# Patient Record
Sex: MALE | Race: WHITE | NOT HISPANIC OR LATINO | Employment: FULL TIME | URBAN - METROPOLITAN AREA
[De-identification: names, ages, dates, MRNs, and addresses within clinical notes are randomized per-mention and may not be internally consistent; named-entity substitution may affect disease eponyms.]

---

## 2017-04-03 ENCOUNTER — HOSPITAL ENCOUNTER (EMERGENCY)
Facility: MEDICAL CENTER | Age: 47
End: 2017-04-03
Attending: EMERGENCY MEDICINE

## 2017-04-03 VITALS
SYSTOLIC BLOOD PRESSURE: 138 MMHG | OXYGEN SATURATION: 98 % | HEIGHT: 78 IN | TEMPERATURE: 97.3 F | BODY MASS INDEX: 25.33 KG/M2 | WEIGHT: 218.92 LBS | HEART RATE: 87 BPM | RESPIRATION RATE: 18 BRPM | DIASTOLIC BLOOD PRESSURE: 88 MMHG

## 2017-04-03 DIAGNOSIS — L03.211 FACIAL CELLULITIS: ICD-10-CM

## 2017-04-03 PROCEDURE — A9270 NON-COVERED ITEM OR SERVICE: HCPCS | Performed by: EMERGENCY MEDICINE

## 2017-04-03 PROCEDURE — 99283 EMERGENCY DEPT VISIT LOW MDM: CPT

## 2017-04-03 PROCEDURE — 700102 HCHG RX REV CODE 250 W/ 637 OVERRIDE(OP): Performed by: EMERGENCY MEDICINE

## 2017-04-03 RX ORDER — HYDROCODONE BITARTRATE AND ACETAMINOPHEN 5; 325 MG/1; MG/1
1 TABLET ORAL ONCE
Status: COMPLETED | OUTPATIENT
Start: 2017-04-03 | End: 2017-04-03

## 2017-04-03 RX ORDER — SULFAMETHOXAZOLE AND TRIMETHOPRIM 800; 160 MG/1; MG/1
1 TABLET ORAL 2 TIMES DAILY
Qty: 14 TAB | Refills: 0 | Status: SHIPPED | OUTPATIENT
Start: 2017-04-03 | End: 2017-04-10

## 2017-04-03 RX ORDER — SULFAMETHOXAZOLE AND TRIMETHOPRIM 800; 160 MG/1; MG/1
1 TABLET ORAL ONCE
Status: COMPLETED | OUTPATIENT
Start: 2017-04-03 | End: 2017-04-03

## 2017-04-03 RX ORDER — LORAZEPAM 0.5 MG/1
0.5 TABLET ORAL EVERY 4 HOURS PRN
COMMUNITY

## 2017-04-03 RX ORDER — CEPHALEXIN 500 MG/1
500 CAPSULE ORAL 4 TIMES DAILY
Qty: 28 CAP | Refills: 0 | Status: SHIPPED | OUTPATIENT
Start: 2017-04-03 | End: 2017-04-10

## 2017-04-03 RX ORDER — CEPHALEXIN 500 MG/1
500 CAPSULE ORAL ONCE
Status: COMPLETED | OUTPATIENT
Start: 2017-04-03 | End: 2017-04-03

## 2017-04-03 RX ORDER — HYDROCODONE BITARTRATE AND ACETAMINOPHEN 5; 325 MG/1; MG/1
1-2 TABLET ORAL EVERY 4 HOURS PRN
Qty: 18 TAB | Refills: 0 | Status: SHIPPED | OUTPATIENT
Start: 2017-04-03 | End: 2017-12-07

## 2017-04-03 RX ADMIN — HYDROCODONE BITARTRATE AND ACETAMINOPHEN 1 TABLET: 5; 325 TABLET ORAL at 05:04

## 2017-04-03 RX ADMIN — SULFAMETHOXAZOLE AND TRIMETHOPRIM 1 TABLET: 800; 160 TABLET ORAL at 05:05

## 2017-04-03 RX ADMIN — CEPHALEXIN 500 MG: 500 CAPSULE ORAL at 05:05

## 2017-04-03 ASSESSMENT — PAIN SCALES - GENERAL: PAINLEVEL_OUTOF10: 9

## 2017-04-03 ASSESSMENT — ENCOUNTER SYMPTOMS
FEVER: 0
SHORTNESS OF BREATH: 0
CHILLS: 1

## 2017-04-03 NOTE — DISCHARGE INSTRUCTIONS
Facial Infection  You have an infection of your face. This requires special attention to help prevent serious problems. Infections in facial wounds can cause poor healing and scars. They can also spread to deeper tissues, especially around the eye. Wound and dental infections can lead to sinusitis, infection of the eye socket, and even meningitis. Permanent damage to the skin, eye, and nervous system may result if facial infections are not treated properly. With severe infections, hospital care for IV antibiotic injections may be needed if they don't respond to oral antibiotics.  Antibiotics must be taken for the full course to insure the infection is eliminated. If the infection came from a bad tooth, it may have to be extracted when the infection is under control. Warm compresses may be applied to reduce skin irritation and remove drainage.  You might need a tetanus shot now if:  · You cannot remember when your last tetanus shot was.   · You have never had a tetanus shot.   · The object that caused your wound was dirty.   If you need a tetanus shot, and you decide not to get one, there is a rare chance of getting tetanus. Sickness from tetanus can be serious. If you got a tetanus shot, your arm may swell, get red and warm to the touch at the shot site. This is common and not a problem.  SEEK IMMEDIATE MEDICAL CARE IF:   · You have increased swelling, redness, or trouble breathing.   · You have a severe headache, dizziness, nausea, or vomiting.   · You develop problems with your eyesight.   · You have a fever.   Document Released: 01/25/2006 Document Revised: 03/11/2013 Document Reviewed: 12/18/2006  ExitCare® Patient Information ©2013 [employer]â„¢.      Cellulitis  Cellulitis is an infection of the skin and the tissue under the skin. The infected area is usually red and tender. This happens most often in the arms and lower legs.  HOME CARE   · Take your antibiotic medicine as told. Finish the medicine even if you  start to feel better.  · Keep the infected arm or leg raised (elevated).  · Put a warm cloth on the area up to 4 times per day.  · Only take medicines as told by your doctor.  · Keep all doctor visits as told.  GET HELP IF:  · You see red streaks on the skin coming from the infected area.  · Your red area gets bigger or turns a dark color.  · Your bone or joint under the infected area is painful after the skin heals.  · Your infection comes back in the same area or different area.  · You have a puffy (swollen) bump in the infected area.  · You have new symptoms.  · You have a fever.  GET HELP RIGHT AWAY IF:   · You feel very sleepy.  · You throw up (vomit) or have watery poop (diarrhea).  · You feel sick and have muscle aches and pains.  MAKE SURE YOU:   · Understand these instructions.  · Will watch your condition.  · Will get help right away if you are not doing well or get worse.     This information is not intended to replace advice given to you by your health care provider. Make sure you discuss any questions you have with your health care provider.     Document Released: 06/05/2009 Document Revised: 01/08/2016 Document Reviewed: 03/04/2013  [employer] Interactive Patient Education ©2016 Elsevier Inc.

## 2017-04-03 NOTE — ED AVS SNAPSHOT
Yottaa Access Code: [email or web address]  Expires: 4/15/2017 12:03 PM    Yottaa  A secure, online tool to manage your health information     [employer]’s Yottaa® is a secure, online tool that connects you to your personalized health information from the privacy of your home -- day or night - making it very easy for you to manage your healthcare. Once the activation process is completed, you can even access your medical information using the Yottaa blanca, which is available for free in the Apple Blanca store or Google Play store.     Yottaa provides the following levels of access (as shown below):   My Chart Features   [facility] Primary Care Doctor [facility]  Specialists [facility]  Urgent  Care Non-[facility]  Primary Care  Doctor   Email your healthcare team securely and privately 24/7 X X X X   Manage appointments: schedule your next appointment; view details of past/upcoming appointments X      Request prescription refills. X      View recent personal medical records, including lab and immunizations X X X X   View health record, including health history, allergies, medications X X X X   Read reports about your outpatient visits, procedures, consult and ER notes X X X X   See your discharge summary, which is a recap of your hospital and/or ER visit that includes your diagnosis, lab results, and care plan. X X       How to register for Yottaa:  1. Go to  [email or web address].  2. Click on the Sign Up Now box, which takes you to the New Member Sign Up page. You will need to provide the following information:  a. Enter your Yottaa Access Code exactly as it appears at the top of this page. (You will not need to use this code after you’ve completed the sign-up process. If you do not sign up before the expiration date, you must request a new code.)   b. Enter your date of birth.   c. Enter your home email address.   d. Click Submit, and follow the next screen’s instructions.  3. Create a Yottaa ID. This will be your Yottaa  login ID and cannot be changed, so think of one that is secure and easy to remember.  4. Create a [employer] password. You can change your password at any time.  5. Enter your Password Reset Question and Answer. This can be used at a later time if you forget your password.   6. Enter your e-mail address. This allows you to receive e-mail notifications when new information is available in [employer].  7. Click Sign Up. You can now view your health information.    For assistance activating your [employer] account, call [phone]

## 2017-04-03 NOTE — ED AVS SNAPSHOT
Home Care Instructions                                                                                                                Cristofer Mallory   MRN: [id number]    Department:  [facility], Emergency Dept   Date of Visit:  4/3/2017            [facility], Emergency Dept    [address] [place] [id number]    Phone:  [phone]      You were seen by     Letitia Moore D.O.      Your Diagnosis Was     Facial cellulitis     L03.211 left      These are the medications you received during your hospitalization from 04/03/2017 0349 to 04/03/2017 0507     Date/Time Order Dose Route Action    04/03/2017 0504 hydrocodone-acetaminophen (NORCO) 5-325 MG per tablet 1 Tab 1 Tab Oral Given    04/03/2017 0505 sulfamethoxazole-trimethoprim (BACTRIM DS) 800-160 MG tablet 1 Tab 1 Tab Oral Given    04/03/2017 0505 cephALEXin (KEFLEX) capsule 500 mg 500 mg Oral Given      Follow-up Information     1. Follow up with [facility], Emergency Dept.    Specialty:  Emergency Medicine    Why:  If symptoms worsen    Contact information    [address] [place] [id number] [phone]      Medication Information     Review all of your home medications and newly ordered medications with your primary doctor and/or pharmacist as soon as possible. Follow medication instructions as directed by your doctor and/or pharmacist.     Please keep your complete medication list with you and share with your physician. Update the information when medications are discontinued, doses are changed, or new medications (including over-the-counter products) are added; and carry medication information at all times in the event of emergency situations.               Medication List      START taking these medications        Instructions    Morning Afternoon Evening Bedtime    cephALEXin 500 MG Caps   Last time this was given:  500 mg on 4/3/2017  5:05 AM   Commonly known as:  KEFLEX        Take  1 Cap by mouth 4 times a day for 7 days.   Dose:  500 mg                        hydrocodone-acetaminophen 5-325 MG Tabs per tablet   Last time this was given:  1 Tab on 4/3/2017  5:04 AM   Commonly known as:  NORCO        Take 1-2 Tabs by mouth every four hours as needed.   Dose:  1-2 Tab                        sulfamethoxazole-trimethoprim 800-160 MG tablet   Last time this was given:  1 Tab on 4/3/2017  5:05 AM   Commonly known as:  BACTRIM DS        Take 1 Tab by mouth 2 times a day for 7 days.   Dose:  1 Tab                          ASK your doctor about these medications        Instructions    Morning Afternoon Evening Bedtime    ATIVAN 0.5 MG Tabs   Generic drug:  lorazepam        Take 0.5 mg by mouth every four hours as needed for Anxiety.   Dose:  0.5 mg                             Where to Get Your Medications      You can get these medications from any pharmacy     Bring a paper prescription for each of these medications    - cephALEXin 500 MG Caps  - hydrocodone-acetaminophen 5-325 MG Tabs per tablet  - sulfamethoxazole-trimethoprim 800-160 MG tablet              Discharge Instructions       Facial Infection  You have an infection of your face. This requires special attention to help prevent serious problems. Infections in facial wounds can cause poor healing and scars. They can also spread to deeper tissues, especially around the eye. Wound and dental infections can lead to sinusitis, infection of the eye socket, and even meningitis. Permanent damage to the skin, eye, and nervous system may result if facial infections are not treated properly. With severe infections, hospital care for IV antibiotic injections may be needed if they don't respond to oral antibiotics.  Antibiotics must be taken for the full course to insure the infection is eliminated. If the infection came from a bad tooth, it may have to be extracted when the infection is under control. Warm compresses may be applied to reduce skin  irritation and remove drainage.  You might need a tetanus shot now if:  · You cannot remember when your last tetanus shot was.   · You have never had a tetanus shot.   · The object that caused your wound was dirty.   If you need a tetanus shot, and you decide not to get one, there is a rare chance of getting tetanus. Sickness from tetanus can be serious. If you got a tetanus shot, your arm may swell, get red and warm to the touch at the shot site. This is common and not a problem.  SEEK IMMEDIATE MEDICAL CARE IF:   · You have increased swelling, redness, or trouble breathing.   · You have a severe headache, dizziness, nausea, or vomiting.   · You develop problems with your eyesight.   · You have a fever.   Document Released: 01/25/2006 Document Revised: 03/11/2013 Document Reviewed: 12/18/2006  Daylife® Patient Information ©2013 [employer].      Cellulitis  Cellulitis is an infection of the skin and the tissue under the skin. The infected area is usually red and tender. This happens most often in the arms and lower legs.  HOME CARE   · Take your antibiotic medicine as told. Finish the medicine even if you start to feel better.  · Keep the infected arm or leg raised (elevated).  · Put a warm cloth on the area up to 4 times per day.  · Only take medicines as told by your doctor.  · Keep all doctor visits as told.  GET HELP IF:  · You see red streaks on the skin coming from the infected area.  · Your red area gets bigger or turns a dark color.  · Your bone or joint under the infected area is painful after the skin heals.  · Your infection comes back in the same area or different area.  · You have a puffy (swollen) bump in the infected area.  · You have new symptoms.  · You have a fever.  GET HELP RIGHT AWAY IF:   · You feel very sleepy.  · You throw up (vomit) or have watery poop (diarrhea).  · You feel sick and have muscle aches and pains.  MAKE SURE YOU:   · Understand these instructions.  · Will watch your  condition.  · Will get help right away if you are not doing well or get worse.     This information is not intended to replace advice given to you by your health care provider. Make sure you discuss any questions you have with your health care provider.     Document Released: 06/05/2009 Document Revised: 01/08/2016 Document Reviewed: 03/04/2013  [employer] Interactive Patient Education ©2016 [employer]            Patient Information     Patient Information    Following emergency treatment: all patient requiring follow-up care must return either to a private physician or a clinic if your condition worsens before you are able to obtain further medical attention, please return to the emergency room.     Billing Information    At [facility], we work to make the billing process streamlined for our patients.  Our Representatives are here to answer any questions you may have regarding your hospital bill.  If you have insurance coverage and have supplied your insurance information to us, we will submit a claim to your insurer on your behalf.  Should you have any questions regarding your bill, we can be reached online or by phone as follows:  Online: You are able pay your bills online or live chat with our representatives about any billing questions you may have. We are here to help Monday - Friday from 8:00am to 7:30pm and 9:00am - 12:00pm on Saturdays.  Please visit [email or web address]  for more information.   Phone:  [phone] or [phone]    Please note that your emergency physician, surgeon, pathologist, radiologist, anesthesiologist, and other specialists are not employed by [facility] and will therefore bill separately for their services.  Please contact them directly for any questions concerning their bills at the numbers below:     Emergency Physician Services:  [phone]  [facility]:  [phone]  [facility]:  [phone]  [facility]:  [phone]    1. Your final bill may vary from the amount quoted upon discharge if all procedures are not complete at that time, or if your doctor has additional procedures of which we are not aware. You will receive an additional bill if you return to the Emergency Department at [facility] for suture removal regardless of the facility of which the sutures were placed.     2. Please arrange for settlement of this account at the emergency registration.    3. All self-pay accounts are due in full at the time of treatment.  If you are unable to meet this obligation then payment is expected within 4-5 days.     4. If you have had radiology studies (CT, X-ray, Ultrasound, MRI), you have received a preliminary result during your emergency department visit. Please contact the radiology department [phone] to receive a copy of your final result. Please discuss the Final result with your primary physician or with the follow up physician provided.     Crisis Hotline:  [place] Crisis Hotline:  0-932-YCDSEUH or 1-591.357.2251  [place] Crisis Hotline:    1-404.152.8724 or 624-687-9026         ED Discharge Follow Up Questions    1. In order to provide you with very good care, we would like to follow up with a phone call in the next few days.  May we have your permission to contact you?     YES /  NO    2. What is the best phone number to call you? (       )_____-__________    3. What is the best time to call you?      Morning  /  Afternoon  /  Evening                   Patient Signature:  ____________________________________________________________    Date:  ____________________________________________________________

## 2017-04-03 NOTE — ED AVS SNAPSHOT
4/3/2017          Cristofer Mallory  [address]  [address]  [facility] [address]    Dear Cristofer:    [facility] wants to ensure your discharge home is safe and you or your loved ones have had all your questions answered regarding your care after you leave the hospital.    You may receive a telephone call within two days of your discharge.  This call is to make certain you understand your discharge instructions as well as ensure we provided you with the best care possible during your stay with us.     The call will only last approximately 3-5 minutes and will be done by a nurse.    Once again, we want to ensure your discharge home is safe and that you have a clear understanding of any next steps in your care.  If you have any questions or concerns, please do not hesitate to contact us, we are here for you.  Thank you for choosing [facility] for your healthcare needs.    Sincerely,    Malvin Mcmanus    [facility]

## 2017-04-03 NOTE — ED NOTES
Patient to ED triage with complaints of congestions and left side facial swelling. States he has hx of same and was told it was a sinus infection. Left side face is swollen and tender. No difficulty swallowing. No respiratory complaints. Swelling started Saturday. No fever identified at home.     Pt educated on ED process and asked to wait in lobby. Patient educated on importance of alerting staff to new or worsening symptoms or concerns.

## 2017-04-03 NOTE — ED PROVIDER NOTES
ED Provider Note    Scribed for Letitia Moore D.O. by Kavin Clay. 4/3/2017, 4:32 AM.    Primary care provider: Pcp Pt States None  Means of arrival: Walk-in  History obtained from: Patient  History limited by: None    CHIEF COMPLAINT  Chief Complaint   Patient presents with   • Facial Swelling     left facial swelling   • Congestion       HPI  Cristofer Mallory is a 46 y.o. male who presents to the Emergency Department with left sided facial swelling and congestion onset 4/1/17.  The patient states that the swelling moved from his cheek toward the front of his face after it began.  His first experience with the swelling was able to be resolved with ice and Advil.  The second experience needed further treatment after being seen by a physician.  He has had associated chills, but no fevers or difficulty breathing.  He states that he has slight dental pain, but nothing incredibly significant.  Notes that he is able to eat all things without difficulty or increased sensitivity.  The patient has no known allergies.      He was here in October 2016 when he reported similar symptoms in July.    REVIEW OF SYSTEMS  Review of Systems   Constitutional: Positive for chills. Negative for fever.   HENT: Positive for congestion.         + Left sided facial swelling   Respiratory: Negative for shortness of breath.    All other systems reviewed and are negative.      PAST MEDICAL HISTORY   Has a history of left sided facial swelling which was diagnosed as a sinus infection.    SURGICAL HISTORY   has past surgical history that includes open post rx cerv vert fx,1 lvl.    SOCIAL HISTORY  Social History   Substance Use Topics   • Smoking status: Current Every Day Smoker -- 0.50 packs/day     Types: Cigarettes   • Smokeless tobacco: None      Comment: 3/4 a pack   • Alcohol Use: Yes      Comment: 6 pack a day      History   Drug Use   • Yes     Comment: pot once a week       FAMILY HISTORY  History reviewed. No pertinent family  "history.    CURRENT MEDICATIONS  Home Medications     Reviewed by Germania George R.N. (Registered Nurse) on 04/03/17 at 0404  Med List Status: Complete    Medication Last Dose Status    lorazepam (ATIVAN) 0.5 MG Tab 4/3/2017 Active                ALLERGIES  No Known Allergies    PHYSICAL EXAM  VITAL SIGNS: /95 mmHg  Pulse 103  Temp(Src) 36.3 °C (97.3 °F)  Resp 16  Ht 1.981 m (6' 6\")  Wt 99.3 kg (218 lb 14.7 oz)  BMI 25.30 kg/m2  SpO2 98%  Vitals reviewed.  Constitutional: Patient is oriented to person, place, and time. Appears well-developed and well-nourished. No distress.    Head: Normocephalic and atraumatic.  Diffuse swelling to the left face.  A small pimple lesion on the left cheek with surrounding induration but no fluctuance.  Ears: Normal external ears bilaterally.   Mouth/Throat: Oropharynx is clear and moist, no exudates.  No swelling to the floor of the mouth.  He is managing his own secretions.  Tenderness and swelling to the left upper buccal surface.     Eyes: Conjunctivae are normal. Pupils are equal, round, and reactive to light.   Neck: Normal range of motion. Neck supple.  Cardiovascular: Tachycardic, regular rhythm and normal heart sounds.   Pulmonary/Chest: Effort normal and breath sounds normal. No respiratory distress, no wheezes.  Musculoskeletal: No edema   Lymphadenopathy: Left cervical adenopathy.   Neurological: No focal deficits.   Skin: Skin is warm and dry. No erythema. No pallor.   Psychiatric: Patient has a normal mood and affect.       COURSE & MEDICAL DECISION MAKING  Pertinent Labs & Imaging studies reviewed. (See chart for details)    He was here in October 2016 when he reported similar symptoms in July.    4:32 AM - Patient seen and examined at bedside. Patient was concerned that this may be a sinusitis but I suspect that is not the case. It appears that he likely had a small lesion or pimple on his left face that became infected and he has an associated " cellulitis. There is no evidence of abscess. No indication for incision and drainage at this time. He is advised on application of warm moist compresses 2-3 times per day. Patient will be treated with Norco 5-325 mg, Bactrim 800-160 mg 1 tab, Keflex 500 mg.     The patient will return for new or worsening symptoms and is stable at the time of discharge.    The patient is referred to a primary physician for blood pressure management, diabetic screening, and for all other preventative health concerns.    DISPOSITION:  Patient will be discharged home in stable condition.    FOLLOW UP:  [facility], Emergency Dept  [address] [facility] [address] [phone]    If symptoms worsen      OUTPATIENT MEDICATIONS:  Discharge Medication List as of 4/3/2017  5:07 AM      START taking these medications    Details   sulfamethoxazole-trimethoprim (BACTRIM DS) 800-160 MG tablet Take 1 Tab by mouth 2 times a day for 7 days., Disp-14 Tab, R-0, Print Rx Paper      cephALEXin (KEFLEX) 500 MG Cap Take 1 Cap by mouth 4 times a day for 7 days., Disp-28 Cap, R-0, Print Rx Paper      hydrocodone-acetaminophen (NORCO) 5-325 MG Tab per tablet Take 1-2 Tabs by mouth every four hours as needed., Disp-18 Tab, R-0, Print Rx Paper                 FINAL IMPRESSION  1. Facial cellulitis          Kavin STOLL (Chris), am scribing for, and in the presence of, Letitia Moore D.O..    Electronically signed by: Kavin Esposito), 4/3/2017    Letitia STOLL D.O. personally performed the services described in this documentation, as scribed by Kavin Clay in my presence, and it is both accurate and complete.    The note accurately reflects work and decisions made by me.  Letiita Moore  4/4/2017  11:41 AM

## 2017-04-03 NOTE — ED NOTES
Pt. Ambulated to [place] [address] with steady gait. Pt. Hooked up to monitoring equipment and changed into hospital gown.

## 2017-10-09 ENCOUNTER — HOSPITAL ENCOUNTER (EMERGENCY)
Facility: MEDICAL CENTER | Age: 47
End: 2017-10-09
Attending: EMERGENCY MEDICINE
Payer: COMMERCIAL

## 2017-10-09 ENCOUNTER — APPOINTMENT (OUTPATIENT)
Dept: RADIOLOGY | Facility: MEDICAL CENTER | Age: 47
End: 2017-10-09
Attending: EMERGENCY MEDICINE
Payer: COMMERCIAL

## 2017-10-09 VITALS
HEART RATE: 89 BPM | OXYGEN SATURATION: 100 % | BODY MASS INDEX: 25.15 KG/M2 | WEIGHT: 217.37 LBS | SYSTOLIC BLOOD PRESSURE: 161 MMHG | DIASTOLIC BLOOD PRESSURE: 108 MMHG | HEIGHT: 78 IN | RESPIRATION RATE: 16 BRPM | TEMPERATURE: 98.5 F

## 2017-10-09 DIAGNOSIS — R60.9 PERIPHERAL EDEMA: ICD-10-CM

## 2017-10-09 DIAGNOSIS — L08.9 FINGER INFECTION: ICD-10-CM

## 2017-10-09 LAB
ALBUMIN SERPL BCP-MCNC: 3.8 G/DL (ref 3.2–4.9)
ALBUMIN/GLOB SERPL: 1.2 G/DL
ALP SERPL-CCNC: 73 U/L (ref 30–99)
ALT SERPL-CCNC: 8 U/L (ref 2–50)
ANION GAP SERPL CALC-SCNC: 8 MMOL/L (ref 0–11.9)
AST SERPL-CCNC: 12 U/L (ref 12–45)
BASOPHILS # BLD AUTO: 1.5 % (ref 0–1.8)
BASOPHILS # BLD: 0.13 K/UL (ref 0–0.12)
BILIRUB SERPL-MCNC: 0.4 MG/DL (ref 0.1–1.5)
BNP SERPL-MCNC: 39 PG/ML (ref 0–100)
BUN SERPL-MCNC: 19 MG/DL (ref 8–22)
CALCIUM SERPL-MCNC: 9.3 MG/DL (ref 8.5–10.5)
CHLORIDE SERPL-SCNC: 103 MMOL/L (ref 96–112)
CO2 SERPL-SCNC: 22 MMOL/L (ref 20–33)
CREAT SERPL-MCNC: 0.95 MG/DL (ref 0.5–1.4)
EOSINOPHIL # BLD AUTO: 0.24 K/UL (ref 0–0.51)
EOSINOPHIL NFR BLD: 2.8 % (ref 0–6.9)
ERYTHROCYTE [DISTWIDTH] IN BLOOD BY AUTOMATED COUNT: 43.7 FL (ref 35.9–50)
GFR SERPL CREATININE-BSD FRML MDRD: >60 ML/MIN/1.73 M 2
GLOBULIN SER CALC-MCNC: 3.1 G/DL (ref 1.9–3.5)
GLUCOSE SERPL-MCNC: 101 MG/DL (ref 65–99)
HCT VFR BLD AUTO: 43.9 % (ref 42–52)
HGB BLD-MCNC: 15.4 G/DL (ref 14–18)
IMM GRANULOCYTES # BLD AUTO: 0.02 K/UL (ref 0–0.11)
IMM GRANULOCYTES NFR BLD AUTO: 0.2 % (ref 0–0.9)
LYMPHOCYTES # BLD AUTO: 2.39 K/UL (ref 1–4.8)
LYMPHOCYTES NFR BLD: 28.3 % (ref 22–41)
MCH RBC QN AUTO: 33.7 PG (ref 27–33)
MCHC RBC AUTO-ENTMCNC: 35.1 G/DL (ref 33.7–35.3)
MCV RBC AUTO: 96.1 FL (ref 81.4–97.8)
MONOCYTES # BLD AUTO: 0.61 K/UL (ref 0–0.85)
MONOCYTES NFR BLD AUTO: 7.2 % (ref 0–13.4)
NEUTROPHILS # BLD AUTO: 5.07 K/UL (ref 1.82–7.42)
NEUTROPHILS NFR BLD: 60 % (ref 44–72)
NRBC # BLD AUTO: 0 K/UL
NRBC BLD AUTO-RTO: 0 /100 WBC
PLATELET # BLD AUTO: 360 K/UL (ref 164–446)
PMV BLD AUTO: 8.8 FL (ref 9–12.9)
POTASSIUM SERPL-SCNC: 3.5 MMOL/L (ref 3.6–5.5)
PROT SERPL-MCNC: 6.9 G/DL (ref 6–8.2)
RBC # BLD AUTO: 4.57 M/UL (ref 4.7–6.1)
SODIUM SERPL-SCNC: 133 MMOL/L (ref 135–145)
TROPONIN I SERPL-MCNC: <0.01 NG/ML (ref 0–0.04)
WBC # BLD AUTO: 8.5 K/UL (ref 4.8–10.8)

## 2017-10-09 PROCEDURE — 85025 COMPLETE CBC W/AUTO DIFF WBC: CPT

## 2017-10-09 PROCEDURE — 83880 ASSAY OF NATRIURETIC PEPTIDE: CPT

## 2017-10-09 PROCEDURE — 80053 COMPREHEN METABOLIC PANEL: CPT

## 2017-10-09 PROCEDURE — 99284 EMERGENCY DEPT VISIT MOD MDM: CPT

## 2017-10-09 PROCEDURE — 84484 ASSAY OF TROPONIN QUANT: CPT

## 2017-10-09 PROCEDURE — 71010 DX-CHEST-PORTABLE (1 VIEW): CPT

## 2017-10-09 PROCEDURE — 303977 HCHG I & D

## 2017-10-09 RX ORDER — CEPHALEXIN 500 MG/1
500 CAPSULE ORAL 4 TIMES DAILY
Qty: 28 CAP | Refills: 0 | Status: SHIPPED | OUTPATIENT
Start: 2017-10-09 | End: 2017-10-16

## 2017-10-09 RX ORDER — SULFAMETHOXAZOLE AND TRIMETHOPRIM 800; 160 MG/1; MG/1
1 TABLET ORAL 2 TIMES DAILY
Qty: 14 TAB | Refills: 0 | Status: SHIPPED | OUTPATIENT
Start: 2017-10-09 | End: 2017-10-16

## 2017-10-09 ASSESSMENT — ENCOUNTER SYMPTOMS
FALLS: 0
SENSORY CHANGE: 0
SHORTNESS OF BREATH: 0

## 2017-10-09 ASSESSMENT — PAIN SCALES - GENERAL
PAINLEVEL_OUTOF10: 4
PAINLEVEL_OUTOF10: 5

## 2017-10-09 NOTE — ED NOTES
.  Chief Complaint   Patient presents with   • Sent from Urgent Care   • Digit Pain     right 2nd digit 2 weeks, swelling noted with blister blow the skin.   • Peripheral Edema     apx month and a half. swelling ble with redness.    • Fatigue   ambulated to triage. Denies injury to finger, pain swelling increasing. Also c/o ble edema intermittent x 1.5 month. Denies sob of hx chf.

## 2017-10-10 NOTE — DISCHARGE INSTRUCTIONS
Peripheral Edema  You have swelling in your legs (peripheral edema). This swelling is due to excess accumulation of salt and water in your body. Edema may be a sign of heart, kidney or liver disease, or a side effect of a medication. It may also be due to problems in the leg veins. Elevating your legs and using special support stockings may be very helpful, if the cause of the swelling is due to poor venous circulation. Avoid long periods of standing, whatever the cause.  Treatment of edema depends on identifying the cause. Chips, pretzels, pickles and other salty foods should be avoided. Restricting salt in your diet is almost always needed. Water pills (diuretics) are often used to remove the excess salt and water from your body via urine. These medicines prevent the kidney from reabsorbing sodium. This increases urine flow.  Diuretic treatment may also result in lowering of potassium levels in your body. Potassium supplements may be needed if you have to use diuretics daily. Daily weights can help you keep track of your progress in clearing your edema. You should call your caregiver for follow up care as recommended.  SEEK IMMEDIATE MEDICAL CARE IF:   · You have increased swelling, pain, redness, or heat in your legs.  · You develop shortness of breath, especially when lying down.  · You develop chest or abdominal pain, weakness, or fainting.  · You have a fever.     This information is not intended to replace advice given to you by your health care provider. Make sure you discuss any questions you have with your health care provider.     Document Released: 01/25/2006 Document Revised: 03/11/2013 Document Reviewed: 01/05/2011  [employer] Interactive Patient Education ©2016 [employer]      Fingertip Infection  When an infection is around the nail, it is called a paronychia. When it appears over the tip of the finger, it is called a felon. These infections are due to minor injuries or cracks in the skin. If they  are not treated properly, they can lead to bone infection and permanent damage to the fingernail.  Incision and drainage is necessary if a pus pocket (an abscess) has formed. Antibiotics and pain medicine may also be needed. Keep your hand elevated for the next 2-3 days to reduce swelling and pain. If a pack was placed in the abscess, it should be removed in 1-2 days by your caregiver. Soak the finger in warm water for 20 minutes 4 times daily to help promote drainage.  Keep the hands as dry as possible. Wear protective gloves with cotton liners. See your caregiver for follow-up care as recommended.   HOME CARE INSTRUCTIONS   · Keep wound clean, dry and dressed as suggested by your caregiver.  · Soak in warm salt water for fifteen minutes, four times per day for bacterial infections.  · Your caregiver will prescribe an antibiotic if a bacterial infection is suspected. Take antibiotics as directed and finish the prescription, even if the problem appears to be improving before the medicine is gone.  · Only take over-the-counter or prescription medicines for pain, discomfort, or fever as directed by your caregiver.  SEEK IMMEDIATE MEDICAL CARE IF:  · There is redness, swelling, or increasing pain in the wound.  · Pus or any other unusual drainage is coming from the wound.  · An unexplained oral temperature above 102° F (38.9° C) develops.  · You notice a foul smell coming from the wound or dressing.  MAKE SURE YOU:   · Understand these instructions.  · Monitor your condition.  · Contact your caregiver if you are getting worse or not improving.     This information is not intended to replace advice given to you by your health care provider. Make sure you discuss any questions you have with your health care provider.     Document Released: 01/25/2006 Document Revised: 03/11/2013 Document Reviewed: 01/21/2010  [employer] Patient Education ©2016 [employer]

## 2017-10-10 NOTE — ED NOTES
Presents to the ED with c/o bilateral lower leg swelling x 1 month. Mild work of breathing noticed. Also endorses blackened area to the 2nd finger tip/nail. Has not seen a PCP in 4-5 years. A&Ox4. Family member endorses he has dyspnea with exertion.

## 2017-12-07 ENCOUNTER — APPOINTMENT (OUTPATIENT)
Dept: RADIOLOGY | Facility: MEDICAL CENTER | Age: 47
DRG: 138 | End: 2017-12-07
Attending: EMERGENCY MEDICINE
Payer: COMMERCIAL

## 2017-12-07 ENCOUNTER — RESOLUTE PROFESSIONAL BILLING HOSPITAL PROF FEE (OUTPATIENT)
Dept: HOSPITALIST | Facility: MEDICAL CENTER | Age: 47
End: 2017-12-07
Payer: COMMERCIAL

## 2017-12-07 ENCOUNTER — HOSPITAL ENCOUNTER (INPATIENT)
Facility: MEDICAL CENTER | Age: 47
LOS: 1 days | DRG: 138 | End: 2017-12-08
Attending: EMERGENCY MEDICINE | Admitting: HOSPITALIST
Payer: COMMERCIAL

## 2017-12-07 DIAGNOSIS — K04.7 TOOTH INFECTION: ICD-10-CM

## 2017-12-07 DIAGNOSIS — R22.0 FACIAL SWELLING: ICD-10-CM

## 2017-12-07 PROBLEM — R60.0 BILATERAL LOWER EXTREMITY EDEMA: Status: ACTIVE | Noted: 2017-12-07

## 2017-12-07 PROBLEM — Z72.0 TOBACCO ABUSE: Status: ACTIVE | Noted: 2017-12-07

## 2017-12-07 LAB
ALBUMIN SERPL BCP-MCNC: 4 G/DL (ref 3.2–4.9)
ALBUMIN/GLOB SERPL: 1.4 G/DL
ALP SERPL-CCNC: 68 U/L (ref 30–99)
ALT SERPL-CCNC: 10 U/L (ref 2–50)
ANION GAP SERPL CALC-SCNC: 8 MMOL/L (ref 0–11.9)
AST SERPL-CCNC: 14 U/L (ref 12–45)
BASOPHILS # BLD AUTO: 0.9 % (ref 0–1.8)
BASOPHILS # BLD: 0.09 K/UL (ref 0–0.12)
BILIRUB SERPL-MCNC: 1 MG/DL (ref 0.1–1.5)
BUN SERPL-MCNC: 9 MG/DL (ref 8–22)
CALCIUM SERPL-MCNC: 9.2 MG/DL (ref 8.5–10.5)
CHLORIDE SERPL-SCNC: 102 MMOL/L (ref 96–112)
CO2 SERPL-SCNC: 27 MMOL/L (ref 20–33)
CREAT SERPL-MCNC: 0.93 MG/DL (ref 0.5–1.4)
EOSINOPHIL # BLD AUTO: 0.1 K/UL (ref 0–0.51)
EOSINOPHIL NFR BLD: 1 % (ref 0–6.9)
ERYTHROCYTE [DISTWIDTH] IN BLOOD BY AUTOMATED COUNT: 44.8 FL (ref 35.9–50)
GFR SERPL CREATININE-BSD FRML MDRD: >60 ML/MIN/1.73 M 2
GLOBULIN SER CALC-MCNC: 2.9 G/DL (ref 1.9–3.5)
GLUCOSE SERPL-MCNC: 118 MG/DL (ref 65–99)
HCT VFR BLD AUTO: 45.6 % (ref 42–52)
HGB BLD-MCNC: 15.5 G/DL (ref 14–18)
IMM GRANULOCYTES # BLD AUTO: 0.04 K/UL (ref 0–0.11)
IMM GRANULOCYTES NFR BLD AUTO: 0.4 % (ref 0–0.9)
INR PPP: 1.08 (ref 0.87–1.13)
LACTATE BLD-SCNC: 2 MMOL/L (ref 0.5–2)
LYMPHOCYTES # BLD AUTO: 1.38 K/UL (ref 1–4.8)
LYMPHOCYTES NFR BLD: 13.2 % (ref 22–41)
MCH RBC QN AUTO: 33.4 PG (ref 27–33)
MCHC RBC AUTO-ENTMCNC: 34 G/DL (ref 33.7–35.3)
MCV RBC AUTO: 98.3 FL (ref 81.4–97.8)
MONOCYTES # BLD AUTO: 0.78 K/UL (ref 0–0.85)
MONOCYTES NFR BLD AUTO: 7.4 % (ref 0–13.4)
NEUTROPHILS # BLD AUTO: 8.09 K/UL (ref 1.82–7.42)
NEUTROPHILS NFR BLD: 77.1 % (ref 44–72)
NRBC # BLD AUTO: 0 K/UL
NRBC BLD AUTO-RTO: 0 /100 WBC
PLATELET # BLD AUTO: 358 K/UL (ref 164–446)
PMV BLD AUTO: 8.6 FL (ref 9–12.9)
POTASSIUM SERPL-SCNC: 3.9 MMOL/L (ref 3.6–5.5)
PROT SERPL-MCNC: 6.9 G/DL (ref 6–8.2)
PROTHROMBIN TIME: 13.7 SEC (ref 12–14.6)
RBC # BLD AUTO: 4.64 M/UL (ref 4.7–6.1)
SODIUM SERPL-SCNC: 137 MMOL/L (ref 135–145)
WBC # BLD AUTO: 10.5 K/UL (ref 4.8–10.8)

## 2017-12-07 PROCEDURE — 85025 COMPLETE CBC W/AUTO DIFF WBC: CPT

## 2017-12-07 PROCEDURE — 83605 ASSAY OF LACTIC ACID: CPT

## 2017-12-07 PROCEDURE — 85610 PROTHROMBIN TIME: CPT

## 2017-12-07 PROCEDURE — 700101 HCHG RX REV CODE 250: Performed by: HOSPITALIST

## 2017-12-07 PROCEDURE — 96374 THER/PROPH/DIAG INJ IV PUSH: CPT

## 2017-12-07 PROCEDURE — 99221 1ST HOSP IP/OBS SF/LOW 40: CPT | Performed by: HOSPITALIST

## 2017-12-07 PROCEDURE — 36415 COLL VENOUS BLD VENIPUNCTURE: CPT

## 2017-12-07 PROCEDURE — 700111 HCHG RX REV CODE 636 W/ 250 OVERRIDE (IP): Performed by: EMERGENCY MEDICINE

## 2017-12-07 PROCEDURE — 70355 PANORAMIC X-RAY OF JAWS: CPT

## 2017-12-07 PROCEDURE — 99285 EMERGENCY DEPT VISIT HI MDM: CPT

## 2017-12-07 PROCEDURE — 770006 HCHG ROOM/CARE - MED/SURG/GYN SEMI*

## 2017-12-07 PROCEDURE — 80053 COMPREHEN METABOLIC PANEL: CPT

## 2017-12-07 PROCEDURE — 700102 HCHG RX REV CODE 250 W/ 637 OVERRIDE(OP): Performed by: HOSPITALIST

## 2017-12-07 PROCEDURE — 700105 HCHG RX REV CODE 258: Performed by: EMERGENCY MEDICINE

## 2017-12-07 PROCEDURE — A9270 NON-COVERED ITEM OR SERVICE: HCPCS | Performed by: HOSPITALIST

## 2017-12-07 RX ORDER — AMOXICILLIN 250 MG
2 CAPSULE ORAL 2 TIMES DAILY
Status: DISCONTINUED | OUTPATIENT
Start: 2017-12-07 | End: 2017-12-08 | Stop reason: HOSPADM

## 2017-12-07 RX ORDER — ACETAMINOPHEN 325 MG/1
650 TABLET ORAL EVERY 6 HOURS PRN
Status: DISCONTINUED | OUTPATIENT
Start: 2017-12-07 | End: 2017-12-08

## 2017-12-07 RX ORDER — ONDANSETRON 2 MG/ML
4 INJECTION INTRAMUSCULAR; INTRAVENOUS ONCE
Status: DISCONTINUED | OUTPATIENT
Start: 2017-12-07 | End: 2017-12-07

## 2017-12-07 RX ORDER — PROMETHAZINE HYDROCHLORIDE 25 MG/1
12.5-25 TABLET ORAL EVERY 4 HOURS PRN
Status: DISCONTINUED | OUTPATIENT
Start: 2017-12-07 | End: 2017-12-08 | Stop reason: HOSPADM

## 2017-12-07 RX ORDER — HYDROMORPHONE HYDROCHLORIDE 2 MG/ML
1 INJECTION, SOLUTION INTRAMUSCULAR; INTRAVENOUS; SUBCUTANEOUS ONCE
Status: COMPLETED | OUTPATIENT
Start: 2017-12-07 | End: 2017-12-07

## 2017-12-07 RX ORDER — CLINDAMYCIN PHOSPHATE 600 MG/50ML
600 INJECTION, SOLUTION INTRAVENOUS EVERY 8 HOURS
Status: DISCONTINUED | OUTPATIENT
Start: 2017-12-07 | End: 2017-12-08 | Stop reason: HOSPADM

## 2017-12-07 RX ORDER — ONDANSETRON 2 MG/ML
4 INJECTION INTRAMUSCULAR; INTRAVENOUS EVERY 4 HOURS PRN
Status: DISCONTINUED | OUTPATIENT
Start: 2017-12-07 | End: 2017-12-08 | Stop reason: HOSPADM

## 2017-12-07 RX ORDER — SULFAMETHOXAZOLE AND TRIMETHOPRIM 800; 160 MG/1; MG/1
0.5 TABLET ORAL ONCE
Status: ON HOLD | COMMUNITY
End: 2017-12-08

## 2017-12-07 RX ORDER — CEPHALEXIN 500 MG/1
500 CAPSULE ORAL ONCE
Status: ON HOLD | COMMUNITY
End: 2017-12-08

## 2017-12-07 RX ORDER — BISACODYL 10 MG
10 SUPPOSITORY, RECTAL RECTAL
Status: DISCONTINUED | OUTPATIENT
Start: 2017-12-07 | End: 2017-12-08 | Stop reason: HOSPADM

## 2017-12-07 RX ORDER — HYDROMORPHONE HYDROCHLORIDE 2 MG/ML
1 INJECTION, SOLUTION INTRAMUSCULAR; INTRAVENOUS; SUBCUTANEOUS
Status: DISCONTINUED | OUTPATIENT
Start: 2017-12-07 | End: 2017-12-08

## 2017-12-07 RX ORDER — OXYCODONE HYDROCHLORIDE 5 MG/1
5 TABLET ORAL EVERY 4 HOURS PRN
Status: DISCONTINUED | OUTPATIENT
Start: 2017-12-07 | End: 2017-12-08

## 2017-12-07 RX ORDER — OXYCODONE HYDROCHLORIDE 10 MG/1
10 TABLET ORAL EVERY 4 HOURS PRN
Status: DISCONTINUED | OUTPATIENT
Start: 2017-12-07 | End: 2017-12-08

## 2017-12-07 RX ORDER — CYCLOBENZAPRINE HCL 10 MG
10 TABLET ORAL 2 TIMES DAILY PRN
COMMUNITY

## 2017-12-07 RX ORDER — SODIUM CHLORIDE 9 MG/ML
INJECTION, SOLUTION INTRAVENOUS CONTINUOUS
Status: DISCONTINUED | OUTPATIENT
Start: 2017-12-07 | End: 2017-12-08

## 2017-12-07 RX ORDER — POLYETHYLENE GLYCOL 3350 17 G/17G
1 POWDER, FOR SOLUTION ORAL
Status: DISCONTINUED | OUTPATIENT
Start: 2017-12-07 | End: 2017-12-08 | Stop reason: HOSPADM

## 2017-12-07 RX ORDER — ONDANSETRON 4 MG/1
4 TABLET, ORALLY DISINTEGRATING ORAL EVERY 4 HOURS PRN
Status: DISCONTINUED | OUTPATIENT
Start: 2017-12-07 | End: 2017-12-08 | Stop reason: HOSPADM

## 2017-12-07 RX ORDER — PROMETHAZINE HYDROCHLORIDE 25 MG/1
12.5-25 SUPPOSITORY RECTAL EVERY 4 HOURS PRN
Status: DISCONTINUED | OUTPATIENT
Start: 2017-12-07 | End: 2017-12-08 | Stop reason: HOSPADM

## 2017-12-07 RX ORDER — NICOTINE 21 MG/24HR
21 PATCH, TRANSDERMAL 24 HOURS TRANSDERMAL
Status: DISCONTINUED | OUTPATIENT
Start: 2017-12-07 | End: 2017-12-08 | Stop reason: HOSPADM

## 2017-12-07 RX ORDER — SODIUM CHLORIDE AND POTASSIUM CHLORIDE 150; 900 MG/100ML; MG/100ML
INJECTION, SOLUTION INTRAVENOUS CONTINUOUS
Status: DISCONTINUED | OUTPATIENT
Start: 2017-12-07 | End: 2017-12-08

## 2017-12-07 RX ADMIN — SODIUM CHLORIDE: 9 INJECTION, SOLUTION INTRAVENOUS at 12:46

## 2017-12-07 RX ADMIN — OXYCODONE HYDROCHLORIDE 5 MG: 5 TABLET ORAL at 17:59

## 2017-12-07 RX ADMIN — HYDROMORPHONE HYDROCHLORIDE 1 MG: 2 INJECTION INTRAMUSCULAR; INTRAVENOUS; SUBCUTANEOUS at 12:46

## 2017-12-07 RX ADMIN — CLINDAMYCIN IN 5 PERCENT DEXTROSE 600 MG: 12 INJECTION, SOLUTION INTRAVENOUS at 17:59

## 2017-12-07 RX ADMIN — CLINDAMYCIN IN 5 PERCENT DEXTROSE 600 MG: 12 INJECTION, SOLUTION INTRAVENOUS at 22:06

## 2017-12-07 RX ADMIN — POTASSIUM CHLORIDE AND SODIUM CHLORIDE: 900; 150 INJECTION, SOLUTION INTRAVENOUS at 22:06

## 2017-12-07 RX ADMIN — POTASSIUM CHLORIDE AND SODIUM CHLORIDE: 900; 150 INJECTION, SOLUTION INTRAVENOUS at 17:25

## 2017-12-07 RX ADMIN — NICOTINE 21 MG: 21 PATCH, EXTENDED RELEASE TRANSDERMAL at 17:26

## 2017-12-07 ASSESSMENT — ENCOUNTER SYMPTOMS
COUGH: 0
SHORTNESS OF BREATH: 0
FEVER: 0
CHILLS: 0
VOMITING: 0

## 2017-12-07 ASSESSMENT — LIFESTYLE VARIABLES
DOES PATIENT WANT TO STOP DRINKING: NO
DO YOU DRINK ALCOHOL: NO
TOTAL SCORE: 2
EVER HAD A DRINK FIRST THING IN THE MORNING TO STEADY YOUR NERVES TO GET RID OF A HANGOVER: YES
CONSUMPTION TOTAL: POSITIVE
AVERAGE NUMBER OF DAYS PER WEEK YOU HAVE A DRINK CONTAINING ALCOHOL: 7
HAVE PEOPLE ANNOYED YOU BY CRITICIZING YOUR DRINKING: NO
PACK_YEARS: 15
ON A TYPICAL DAY WHEN YOU DRINK ALCOHOL HOW MANY DRINKS DO YOU HAVE: 6
EVER_SMOKED: YES
HAVE YOU EVER FELT YOU SHOULD CUT DOWN ON YOUR DRINKING: YES
ALCOHOL_USE: YES
TOTAL SCORE: 2
EVER FELT BAD OR GUILTY ABOUT YOUR DRINKING: NO
TOTAL SCORE: 2
HOW MANY TIMES IN THE PAST YEAR HAVE YOU HAD 5 OR MORE DRINKS IN A DAY: 365

## 2017-12-07 ASSESSMENT — PAIN SCALES - GENERAL
PAINLEVEL_OUTOF10: 10
PAINLEVEL_OUTOF10: 8

## 2017-12-07 NOTE — ED NOTES
Pt to triage with left facial swelling since yesterday. Denies dental pain. Pt reports he similar swelling in April.   Pt advised to return to triage nurse for any changes or concerns.

## 2017-12-07 NOTE — ED NOTES
The Medication Reconciliation process has been completed by interviewing the patient    Allergies have been reviewed  Antibiotic use in 30 days - leftover ABX - cephalexin 500 mg and 1/2 Septra DC this am.    Home Pharmacy:  [address]

## 2017-12-07 NOTE — ED PROVIDER NOTES
"ED Provider Note    CHIEF COMPLAINT  Chief Complaint   Patient presents with   • Facial Swelling       HPI  Cristofer Mallory is a 47 y.o. male who presentsPointing to pain and swelling to left side of his face over the last few days. Bad teeth. Hasn't been to with dentist in quite some time. No headache, no stiff neck. No chest pain. No abdominal pain.    REVIEW OF SYSTEMS  No headache, no chest pain, no abdominal pain.  ALL OTHER SYSTEMS NEGATIVE    ALLERGIES  No Known Allergies      PAST MEDICAL HISTORY  Bad teeth    SURGICAL HISTORY  Past Surgical History:   Procedure Laterality Date   • PB OPEN POST RX CERV VERT FX,1 LVL      c4-c5       FAMILY HISTORY  Negative    SOCIAL HISTORY  Social History     Social History   • Marital status:      Spouse name: N/A   • Number of children: N/A   • Years of education: N/A     Social History Main Topics   • Smoking status: Current Every Day Smoker     Packs/day: 0.50     Types: Cigarettes   • Smokeless tobacco: Not on file      Comment: 3/4 a pack   • Alcohol use Yes      Comment: 6 pack a day   • Drug use:       Comment: pot once a week   • Sexual activity: Not on file     Other Topics Concern   • Not on file     Social History Narrative   • No narrative on file       PHYSICAL EXAM  GENERAL:Alert male adult with obvious facial swelling  VITAL SIGNS: /94   Pulse (!) 110   Temp 36.4 °C (97.5 °F)   Resp 18   Ht 1.981 m (6' 6\")   Wt 101.4 kg (223 lb 8.7 oz)   SpO2 97%   BMI 25.83 kg/m²    Constitutional: Alert healthy-appearing adult HENT: Scalp is normal size and nontender. Ears are clear. Nose is clear. Throat is clear with no stridor no drooling no trismus. She is dental caries. Significant swelling to the left side of his face mainly involving the nasolabial fold on the left side..  Eyes: Pupils equal round and reactive to light, extraocular motor fall. There is no scleral icterus.  Neck: Neck is supple and nontender. There is no meningismus. No adenitis. " No thyromegaly.  Lymphatic: No adenopathy.   Cardiovascular: Heart regular rhythm without murmurs or gallops   Thorax & Lungs: No chest wall tenderness. Lungs are clear. Patient has good breath sounds bilateral. No rales, no rhonchi, no wheezes.  Skin: Warm, pink, and dry with no erythema and no rash.   Back: Nontender, no midline bony tenderness, no flank tenderness.  Extremities: Full range of motion  No tenderness to palpation and no deformities noted. No calf or thigh swelling. No calf or thigh tenderness. No clinical DVT.  Neurologic: Alert & oriented . Cranial nerves are grossly intact as tested. Patient moves all 4 extremities well. Patient has good strong flexion and extension of the ankle joints knee joints hip joints and elbow joints. Sensation is normal and symmetrical in the upper and lower extremities.   Psychiatric: Patient is alert oriented coherent and rational.     RADIOLOGY/PROCEDURES  [id number]   Final Result      Periapical lucency involving the innermost right mandibular molar tooth could relate to dental decay.            COURSE & MEDICAL DECISION MAKING  Patient has significant swelling to the left side of his face and the nasolabial fold area. He has diffuse dental caries. Certainly is some type of infection or cellulitis or abscess probably related to the poor teeth.    Plan: #1 IV #2 and by mouth #3. Panorex of the mandible #4. Lab including CBC, CMP, pro time, lactate level. 5. Intravenous Cleocin No. 6. Intravenous Zofran and Dilaudid.    Panorex of the mandible shows a lucency of the innermost right mandibular molar teeth with dental decay.  CBC is normal. No anemia and no bandemia. Chemistry panel is normal. Lactate level is 2.0.  Results for orders placed or performed during the hospital encounter of 12/07/17   CBC WITH DIFFERENTIAL   Result Value Ref Range    WBC 10.5 4.8 - 10.8 K/uL    RBC 4.64 (L) 4.70 - 6.10 M/uL    Hemoglobin 15.5 14.0 - 18.0 g/dL    Hematocrit 45.6  42.0 - 52.0 %    MCV 98.3 (H) 81.4 - 97.8 fL    MCH 33.4 (H) 27.0 - 33.0 pg    MCHC 34.0 33.7 - 35.3 g/dL    RDW 44.8 35.9 - 50.0 fL    Platelet Count 358 164 - 446 K/uL    MPV 8.6 (L) 9.0 - 12.9 fL    Neutrophils-Polys 77.10 (H) 44.00 - 72.00 %    Lymphocytes 13.20 (L) 22.00 - 41.00 %    Monocytes 7.40 0.00 - 13.40 %    Eosinophils 1.00 0.00 - 6.90 %    Basophils 0.90 0.00 - 1.80 %    Immature Granulocytes 0.40 0.00 - 0.90 %    Nucleated RBC 0.00 /100 WBC    Neutrophils (Absolute) 8.09 (H) 1.82 - 7.42 K/uL    Lymphs (Absolute) 1.38 1.00 - 4.80 K/uL    Monos (Absolute) 0.78 0.00 - 0.85 K/uL    Eos (Absolute) 0.10 0.00 - 0.51 K/uL    Baso (Absolute) 0.09 0.00 - 0.12 K/uL    Immature Granulocytes (abs) 0.04 0.00 - 0.11 K/uL    NRBC (Absolute) 0.00 K/uL   COMP METABOLIC PANEL   Result Value Ref Range    Sodium 137 135 - 145 mmol/L    Potassium 3.9 3.6 - 5.5 mmol/L    Chloride 102 96 - 112 mmol/L    Co2 27 20 - 33 mmol/L    Anion Gap 8.0 0.0 - 11.9    Glucose 118 (H) 65 - 99 mg/dL    Bun 9 8 - 22 mg/dL    Creatinine 0.93 0.50 - 1.40 mg/dL    Calcium 9.2 8.5 - 10.5 mg/dL    AST(SGOT) 14 12 - 45 U/L    ALT(SGPT) 10 2 - 50 U/L    Alkaline Phosphatase 68 30 - 99 U/L    Total Bilirubin 1.0 0.1 - 1.5 mg/dL    Albumin 4.0 3.2 - 4.9 g/dL    Total Protein 6.9 6.0 - 8.2 g/dL    Globulin 2.9 1.9 - 3.5 g/dL    A-G Ratio 1.4 g/dL   PROTHROMBIN TIME   Result Value Ref Range    PT 13.7 12.0 - 14.6 sec    INR 1.08 0.87 - 1.13   LACTIC ACID   Result Value Ref Range    Lactic Acid 2.0 0.5 - 2.0 mmol/L   ESTIMATED GFR   Result Value Ref Range    GFR If African American >60 >60 mL/min/1.73 m 2    GFR If Non African American >60 >60 mL/min/1.73 m 2    Discussed case with the hospitalist Dr. Davin Wynn he'll be the admitting physician. Also discussed the case with the referral surgeon Dr. ware her will see the patient and take him to the operating room. Stable on admission.    FINAL IMPRESSION  1. Facial swelling and infection.  2.  periapical tooth infection          Electronically signed by: Gary Gansert, 12/7/2017 12:30 PM

## 2017-12-08 VITALS
SYSTOLIC BLOOD PRESSURE: 137 MMHG | TEMPERATURE: 98.3 F | DIASTOLIC BLOOD PRESSURE: 95 MMHG | HEIGHT: 78 IN | HEART RATE: 92 BPM | OXYGEN SATURATION: 97 % | WEIGHT: 223 LBS | BODY MASS INDEX: 25.8 KG/M2 | RESPIRATION RATE: 18 BRPM

## 2017-12-08 PROBLEM — K02.9 INFECTED DENTAL CARRIES: Status: ACTIVE | Noted: 2017-12-08

## 2017-12-08 PROBLEM — I10 HTN (HYPERTENSION): Status: ACTIVE | Noted: 2017-12-08

## 2017-12-08 PROBLEM — K04.7 INFECTED DENTAL CARRIES: Status: ACTIVE | Noted: 2017-12-08

## 2017-12-08 PROBLEM — F10.10 ALCOHOL ABUSE: Status: ACTIVE | Noted: 2017-12-08

## 2017-12-08 PROCEDURE — 500122 HCHG BOVIE, BLADE: Performed by: DENTIST

## 2017-12-08 PROCEDURE — 500445 HCHG HEMOSTAT, SURGICEL 4X8: Performed by: DENTIST

## 2017-12-08 PROCEDURE — 500380 HCHG DRAIN, PENROSE 1/4X12: Performed by: DENTIST

## 2017-12-08 PROCEDURE — 700102 HCHG RX REV CODE 250 W/ 637 OVERRIDE(OP): Performed by: HOSPITALIST

## 2017-12-08 PROCEDURE — 160009 HCHG ANES TIME/MIN: Performed by: DENTIST

## 2017-12-08 PROCEDURE — A9270 NON-COVERED ITEM OR SERVICE: HCPCS

## 2017-12-08 PROCEDURE — A9270 NON-COVERED ITEM OR SERVICE: HCPCS | Performed by: HOSPITALIST

## 2017-12-08 PROCEDURE — 160036 HCHG PACU - EA ADDL 30 MINS PHASE I: Performed by: DENTIST

## 2017-12-08 PROCEDURE — 90471 IMMUNIZATION ADMIN: CPT

## 2017-12-08 PROCEDURE — 700102 HCHG RX REV CODE 250 W/ 637 OVERRIDE(OP)

## 2017-12-08 PROCEDURE — 501445 HCHG STAPLER, SKIN DISP: Performed by: DENTIST

## 2017-12-08 PROCEDURE — 0W930ZZ DRAINAGE OF ORAL CAVITY AND THROAT, OPEN APPROACH: ICD-10-PCS | Performed by: DENTIST

## 2017-12-08 PROCEDURE — 160028 HCHG SURGERY MINUTES - 1ST 30 MINS LEVEL 3: Performed by: DENTIST

## 2017-12-08 PROCEDURE — 501838 HCHG SUTURE GENERAL: Performed by: DENTIST

## 2017-12-08 PROCEDURE — 90732 PPSV23 VACC 2 YRS+ SUBQ/IM: CPT | Performed by: HOSPITALIST

## 2017-12-08 PROCEDURE — 500754 HCHG JAW BRA: Performed by: DENTIST

## 2017-12-08 PROCEDURE — 700101 HCHG RX REV CODE 250

## 2017-12-08 PROCEDURE — 160048 HCHG OR STATISTICAL LEVEL 1-5: Performed by: DENTIST

## 2017-12-08 PROCEDURE — 700101 HCHG RX REV CODE 250: Performed by: HOSPITALIST

## 2017-12-08 PROCEDURE — 700111 HCHG RX REV CODE 636 W/ 250 OVERRIDE (IP)

## 2017-12-08 PROCEDURE — 700111 HCHG RX REV CODE 636 W/ 250 OVERRIDE (IP): Performed by: HOSPITALIST

## 2017-12-08 PROCEDURE — 3E0234Z INTRODUCTION OF SERUM, TOXOID AND VACCINE INTO MUSCLE, PERCUTANEOUS APPROACH: ICD-10-PCS | Performed by: HOSPITALIST

## 2017-12-08 PROCEDURE — 90686 IIV4 VACC NO PRSV 0.5 ML IM: CPT | Performed by: HOSPITALIST

## 2017-12-08 PROCEDURE — 500126 HCHG BOVIE, NEEDLE TIP: Performed by: DENTIST

## 2017-12-08 PROCEDURE — 0CDWXZ0 EXTRACTION OF UPPER TOOTH, SINGLE, EXTERNAL APPROACH: ICD-10-PCS | Performed by: DENTIST

## 2017-12-08 PROCEDURE — 160002 HCHG RECOVERY MINUTES (STAT): Performed by: DENTIST

## 2017-12-08 PROCEDURE — 160035 HCHG PACU - 1ST 60 MINS PHASE I: Performed by: DENTIST

## 2017-12-08 PROCEDURE — 99239 HOSP IP/OBS DSCHRG MGMT >30: CPT | Performed by: HOSPITALIST

## 2017-12-08 RX ORDER — LORAZEPAM 2 MG/ML
1.5 INJECTION INTRAMUSCULAR
Status: DISCONTINUED | OUTPATIENT
Start: 2017-12-08 | End: 2017-12-08 | Stop reason: HOSPADM

## 2017-12-08 RX ORDER — OXYCODONE HYDROCHLORIDE 5 MG/1
5 TABLET ORAL EVERY 4 HOURS PRN
Status: DISCONTINUED | OUTPATIENT
Start: 2017-12-08 | End: 2017-12-08 | Stop reason: HOSPADM

## 2017-12-08 RX ORDER — LORAZEPAM 2 MG/ML
0.5 INJECTION INTRAMUSCULAR EVERY 4 HOURS PRN
Status: DISCONTINUED | OUTPATIENT
Start: 2017-12-08 | End: 2017-12-08 | Stop reason: HOSPADM

## 2017-12-08 RX ORDER — HYDROMORPHONE HYDROCHLORIDE 2 MG/ML
.5-1 INJECTION, SOLUTION INTRAMUSCULAR; INTRAVENOUS; SUBCUTANEOUS EVERY 4 HOURS PRN
Status: DISCONTINUED | OUTPATIENT
Start: 2017-12-08 | End: 2017-12-08 | Stop reason: HOSPADM

## 2017-12-08 RX ORDER — CARVEDILOL 6.25 MG/1
3.12 TABLET ORAL 2 TIMES DAILY WITH MEALS
Status: DISCONTINUED | OUTPATIENT
Start: 2017-12-08 | End: 2017-12-08 | Stop reason: HOSPADM

## 2017-12-08 RX ORDER — LORAZEPAM 2 MG/ML
1 INJECTION INTRAMUSCULAR
Status: DISCONTINUED | OUTPATIENT
Start: 2017-12-08 | End: 2017-12-08 | Stop reason: HOSPADM

## 2017-12-08 RX ORDER — ACETAMINOPHEN 325 MG/1
650 TABLET ORAL EVERY 6 HOURS
Status: DISCONTINUED | OUTPATIENT
Start: 2017-12-08 | End: 2017-12-08 | Stop reason: HOSPADM

## 2017-12-08 RX ORDER — LORAZEPAM 1 MG/1
3 TABLET ORAL
Status: DISCONTINUED | OUTPATIENT
Start: 2017-12-08 | End: 2017-12-08 | Stop reason: HOSPADM

## 2017-12-08 RX ORDER — LORAZEPAM 2 MG/ML
2 INJECTION INTRAMUSCULAR
Status: DISCONTINUED | OUTPATIENT
Start: 2017-12-08 | End: 2017-12-08 | Stop reason: HOSPADM

## 2017-12-08 RX ORDER — NICOTINE 21 MG/24HR
PATCH, TRANSDERMAL 24 HOURS TRANSDERMAL
Qty: 30 PATCH | COMMUNITY
Start: 2017-12-09

## 2017-12-08 RX ORDER — LORAZEPAM 1 MG/1
0.5 TABLET ORAL EVERY 4 HOURS PRN
Status: DISCONTINUED | OUTPATIENT
Start: 2017-12-08 | End: 2017-12-08 | Stop reason: HOSPADM

## 2017-12-08 RX ORDER — HEPARIN SODIUM 5000 [USP'U]/ML
5000 INJECTION, SOLUTION INTRAVENOUS; SUBCUTANEOUS EVERY 8 HOURS
Status: DISCONTINUED | OUTPATIENT
Start: 2017-12-08 | End: 2017-12-08 | Stop reason: HOSPADM

## 2017-12-08 RX ORDER — SODIUM CHLORIDE 9 MG/ML
INJECTION, SOLUTION INTRAVENOUS CONTINUOUS
Status: DISCONTINUED | OUTPATIENT
Start: 2017-12-08 | End: 2017-12-08 | Stop reason: HOSPADM

## 2017-12-08 RX ORDER — LORAZEPAM 1 MG/1
4 TABLET ORAL
Status: DISCONTINUED | OUTPATIENT
Start: 2017-12-08 | End: 2017-12-08 | Stop reason: HOSPADM

## 2017-12-08 RX ORDER — OXYCODONE HYDROCHLORIDE 10 MG/1
10 TABLET ORAL EVERY 4 HOURS PRN
Status: DISCONTINUED | OUTPATIENT
Start: 2017-12-08 | End: 2017-12-08 | Stop reason: HOSPADM

## 2017-12-08 RX ORDER — AMOXICILLIN AND CLAVULANATE POTASSIUM 875; 125 MG/1; MG/1
1 TABLET, FILM COATED ORAL 2 TIMES DAILY
Qty: 14 TAB | Refills: 0 | Status: SHIPPED | OUTPATIENT
Start: 2017-12-08

## 2017-12-08 RX ORDER — LORAZEPAM 1 MG/1
1 TABLET ORAL EVERY 4 HOURS PRN
Status: DISCONTINUED | OUTPATIENT
Start: 2017-12-08 | End: 2017-12-08 | Stop reason: HOSPADM

## 2017-12-08 RX ORDER — CARVEDILOL 3.12 MG/1
3.12 TABLET ORAL 2 TIMES DAILY WITH MEALS
Qty: 60 TAB | Refills: 1 | Status: SHIPPED | OUTPATIENT
Start: 2017-12-08 | End: 2018-02-28 | Stop reason: SDUPTHER

## 2017-12-08 RX ORDER — OXYCODONE HCL 5 MG/5 ML
SOLUTION, ORAL ORAL
Status: COMPLETED
Start: 2017-12-08 | End: 2017-12-08

## 2017-12-08 RX ORDER — LORAZEPAM 1 MG/1
2 TABLET ORAL
Status: DISCONTINUED | OUTPATIENT
Start: 2017-12-08 | End: 2017-12-08 | Stop reason: HOSPADM

## 2017-12-08 RX ORDER — LIDOCAINE HYDROCHLORIDE AND EPINEPHRINE 10; 10 MG/ML; UG/ML
INJECTION, SOLUTION INFILTRATION; PERINEURAL
Status: DISCONTINUED | OUTPATIENT
Start: 2017-12-08 | End: 2017-12-08 | Stop reason: HOSPADM

## 2017-12-08 RX ADMIN — CARVEDILOL 3.12 MG: 6.25 TABLET, FILM COATED ORAL at 10:23

## 2017-12-08 RX ADMIN — OXYCODONE HYDROCHLORIDE 5 MG: 5 SOLUTION ORAL at 08:55

## 2017-12-08 RX ADMIN — STANDARDIZED SENNA CONCENTRATE AND DOCUSATE SODIUM 2 TABLET: 8.6; 5 TABLET, FILM COATED ORAL at 10:23

## 2017-12-08 RX ADMIN — PNEUMOCOCCAL VACCINE POLYVALENT 25 MCG
25; 25; 25; 25; 25; 25; 25; 25; 25; 25; 25; 25; 25; 25; 25; 25; 25; 25; 25; 25; 25; 25; 25 INJECTION, SOLUTION INTRAMUSCULAR; SUBCUTANEOUS at 02:05

## 2017-12-08 RX ADMIN — OXYCODONE HYDROCHLORIDE 5 MG: 5 TABLET ORAL at 02:04

## 2017-12-08 RX ADMIN — INFLUENZA A VIRUS A/MICHIGAN/45/2015 X-275 (H1N1) ANTIGEN (FORMALDEHYDE INACTIVATED), INFLUENZA A VIRUS A/HONG KONG/4801/2014 X-263B (H3N2) ANTIGEN (FORMALDEHYDE INACTIVATED), INFLUENZA B VIRUS B/PHUKET/3073/2013 ANTIGEN (FORMALDEHYDE INACTIVATED), AND INFLUENZA B VIRUS B/BRISBANE/60/2008 ANTIGEN (FORMALDEHYDE INACTIVATED) 0.5 ML: 15; 15; 15; 15 INJECTION, SUSPENSION INTRAMUSCULAR at 02:09

## 2017-12-08 RX ADMIN — CLINDAMYCIN IN 5 PERCENT DEXTROSE 600 MG: 12 INJECTION, SOLUTION INTRAVENOUS at 05:00

## 2017-12-08 ASSESSMENT — PAIN SCALES - GENERAL
PAINLEVEL_OUTOF10: 0
PAINLEVEL_OUTOF10: 3

## 2017-12-08 ASSESSMENT — ENCOUNTER SYMPTOMS
SORE THROAT: 0
VOMITING: 0
NAUSEA: 0
FEVER: 0

## 2017-12-08 ASSESSMENT — LIFESTYLE VARIABLES
PACK_YEARS: 15
EVER_SMOKED: YES

## 2017-12-08 NOTE — PROGRESS NOTES
Pt back from procedure.  Denies pain. Denies nausea.  L. Face swollen.  C/o being hungry, awaiting orders.  No further needs at this time.

## 2017-12-08 NOTE — PROGRESS NOTES
Assumed care at 0700. Received report from night shift RN. Bedside report completed. AOx4.    States pain is tolerable at this time.  Denies nausea.  NPO for procedure. +voiding.   Ambulating with steady gait. Pt call light and belongings within reach, fall precautions in place.     0720 Pt with transport in bed to pre-op

## 2017-12-08 NOTE — CONSULTS
MAXILLOFACIAL SURGERY NOTE    DATE OF CONSULTATION:  12/8/2017    CHIEF COMPLAINT:  Facial pain and swelling    HISTORY OF PRESENT ILLNESS:  This is a 47 y.o. male who has a history of pain and swelling in the left upper jaw.  He has had worsening symptoms over the last several days until male came to the hospital.      Past Medical/ Family / Social history (PFSH):   Past Medical History:   Active Ambulatory Problems     Diagnosis Date Noted   • No Active Ambulatory Problems     Resolved Ambulatory Problems     Diagnosis Date Noted   • No Resolved Ambulatory Problems     No Additional Past Medical History     No past medical history on file.      Past Surgical History:   None    Current Outpatient Medications:   Current Facility-Administered Medications   Medication Dose   • NS infusion     • acetaminophen (TYLENOL) tablet 650 mg  650 mg   • HYDROmorphone (DILAUDID) injection 0.5-1 mg  0.5-1 mg   • oxycodone immediate-release (ROXICODONE) tablet 5 mg  5 mg    Or   • oxycodone immediate release (ROXICODONE) tablet 10 mg  10 mg   • heparin injection 5,000 Units  5,000 Units   • carvedilol (COREG) tablet 3.125 mg  3.125 mg   • lorazepam (ATIVAN) tablet 0.5 mg  0.5 mg   • lorazepam (ATIVAN) tablet 1 mg  1 mg    Or   • lorazepam (ATIVAN) injection 0.5 mg  0.5 mg   • lorazepam (ATIVAN) tablet 2 mg  2 mg    Or   • lorazepam (ATIVAN) injection 1 mg  1 mg   • lorazepam (ATIVAN) tablet 3 mg  3 mg    Or   • lorazepam (ATIVAN) injection 1.5 mg  1.5 mg   • lorazepam (ATIVAN) tablet 4 mg  4 mg    Or   • lorazepam (ATIVAN) injection 2 mg  2 mg   • senna-docusate (PERICOLACE or SENOKOT S) 8.6-50 MG per tablet 2 Tab  2 Tab    And   • polyethylene glycol/lytes (MIRALAX) PACKET 1 Packet  1 Packet    And   • magnesium hydroxide (MILK OF MAGNESIA) suspension 30 mL  30 mL    And   • bisacodyl (DULCOLAX) suppository 10 mg  10 mg   • ondansetron (ZOFRAN) syringe/vial injection 4 mg  4 mg   • ondansetron (ZOFRAN ODT) dispertab 4 mg  4 mg    • promethazine (PHENERGAN) tablet 12.5-25 mg  12.5-25 mg   • promethazine (PHENERGAN) suppository 12.5-25 mg  12.5-25 mg   • prochlorperazine (COMPAZINE) injection 5-10 mg  5-10 mg   • nicotine (NICODERM) 21 MG/24HR 21 mg  21 mg    And   • nicotine polacrilex (NICORETTE) 2 MG piece 2 mg  2 mg   • clindamycin (CLEOCIN) IVPB premix 600 mg  600 mg       Medication Allergy/Sensitivities:   No Known Allergies     Family History:   Denies hx of cancer, DM, HTN     Social History:   NONE    Allergies:  No Known Allergies    REVIEW OF SYSTEMS:  Denies CP, Denies SOB, Denies any Changes in vision, no nausea, no GI upset, 12 point ROS was done and is negative per the HPI.  Facial pain.     PHYSICAL EXAMINATION:  VITAL SIGNS:  Stable.  male is afebrile.  GENERAL:  male is in no acute distress.  HEENT:  Head is normocephalic and atraumatic. Facial edema left upper face, cheek  EARS: TM clear.    EYES: Extraocular   muscles are intact with no entrapment.  Pupils equally round and reactive to light and   accommodation.    NOSE: Nares are patent bilateral with no crepitus of the nasal bones  ORAL:   35 mm mouth opening.  no deviation upon opening.  Dentition is  THROAT:  Mallampati 1  HEART:  His heart was regular rate and rhythm.  LUNGS:  Clear to auscultation bilaterally.    X-RAYS:  Normal trabecular pattern of bone. Possible fractured or abscessed tooth 12,13,14.  Possible periodontal disease        ASSESSMENT:  This is a 47 y.o. male who has left canine space abscess      PLAN:  Incision and drainage left canine space abscess. Possible extraction of 12.  Will evaluate upper left side teeth in OR.          ____________________________________     JEANNA VIEIRA DMD, MD

## 2017-12-08 NOTE — OR SURGEON
Immediate Post OP Note    PreOp Diagnosis: left canine space abscess abscessed tooth #14     PostOp Diagnosis: Same    Procedure(s):  DENTAL EXTRACTION #14(S) - Wound Class: Dirty or Infected  INCISION AND DRAINAGE GENERAL- FACIAL ABSCESS - Wound Class: Dirty or Infected    Surgeon(s):  Niall Edwards DMD, M.D.    Anesthesiologist/Type of Anesthesia:  Anesthesiologist: Juma Yusuf M.D./General    Surgical Staff:  Circulator: Catia Graham RBILL; Niall Duggan R.N.  Scrub Person: Shaw Douglas    Specimens:  * No specimens in log *    Estimated Blood Loss: 10ml    Findings: Consistent with diagnosis. 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

## 2017-12-08 NOTE — DISCHARGE SUMMARY
CHIEF COMPLAINT ON ADMISSION  Chief Complaint   Patient presents with   • Facial Swelling       CODE STATUS  Full Code    HPI & HOSPITAL COURSE  47 y.o. male who presented 12/7/2017 with left upper facial swelling. Mr. Mallory has a hx of tobacco use that developed left upper jaw pain and swelling two days ago. Despite keflex and bactrim, his symptoms worsened. Mr. Mallory has had a x-ray here in the emergency room has received IV antibiotics. Dr. Scott, oral surgery, has been consulted from the emergency room and plans on taking him to surgery in the morning. Mr. Mallory states that he is very anxious to smoke and is withdrawing from nicotine. He states the pain has been severe as requesting pain medications.    The patient went under extraction of #14 tooth w/o complications.  IV clindamycin was continued.  This afternoon Dr. Edwards deemed him appropriate for home c PO Augmentin.  At the time of this summary the patient was eating and drinking well, having BM's and was hemodynamically stable.    Therefore, he is discharged in improved and stable condition with close outpatient follow-up.    SPECIFIC OUTPATIENT FOLLOW-UP  Dr. RAFI Edwards ([facility]) per his rec.    DISCHARGE PROBLEM LIST  Active Problems:    Dental abscess POA: Yes    Tobacco abuse POA: Yes    Bilateral lower extremity edema POA: Yes    HTN (hypertension) POA: Unknown    Infected dental carries POA: Unknown    Alcohol abuse POA: Unknown  Resolved Problems:    * No resolved hospital problems. *      FOLLOW UP  PCP/[facility] in 2-3 wks.    MEDICATIONS ON DISCHARGE   Cristofer Mallory   Home Medication Instructions CARTER:[id number]    Printed on:12/08/17 0199   Medication Information                      amoxicillin-clavulanate (AUGMENTIN) 875-125 MG Tab  Take 1 Tab by mouth 2 times a day.             carvedilol (COREG) 3.125 MG Tab  Take 1 Tab by mouth 2 times a day, with meals.             cyclobenzaprine (FLEXERIL) 10 MG Tab  Take 10 mg by mouth 2  times a day as needed (prn).             lorazepam (ATIVAN) 0.5 MG Tab  Take 0.5 mg by mouth every four hours as needed for Anxiety.             nicotine (NICODERM) 21 MG/24HR PATCH 24 HR  Apply  to skin as directed.                 DIET  Orders Placed This Encounter   Procedures   • DIET ORDER     Standing Status:   Standing     Number of Occurrences:   1     Order Specific Question:   Diet:     Answer:   Regular [1]     Order Specific Question:   Texture/Fiber modifications:     Answer:   Dysphagia 3(Mechanical Soft)specify fluid consistency(question 6) [3]       ACTIVITY  Gradual increase in activity.    CONSULTATIONS  Dr. RAFI Edwards - [facility].    PROCEDURES  [id number]   Final Result      Periapical lucency involving the innermost right mandibular molar tooth could relate to dental decay.      LE VENOUS DUPLEX - DVT ([facility] Only)    (Results Pending)     #14 tooth extraction      LABORATORY  Lab Results   Component Value Date/Time    SODIUM 137 12/07/2017 12:40 PM    POTASSIUM 3.9 12/07/2017 12:40 PM    CHLORIDE 102 12/07/2017 12:40 PM    CO2 27 12/07/2017 12:40 PM    GLUCOSE 118 (H) 12/07/2017 12:40 PM    BUN 9 12/07/2017 12:40 PM    CREATININE 0.93 12/07/2017 12:40 PM    CREATININE 1.1 10/04/2005 10:05 AM        Lab Results   Component Value Date/Time    WBC 10.5 12/07/2017 12:40 PM    HEMOGLOBIN 15.5 12/07/2017 12:40 PM    HEMATOCRIT 45.6 12/07/2017 12:40 PM    PLATELETCT 358 12/07/2017 12:40 PM        Total time of the discharge process exceeds 33 min.

## 2017-12-08 NOTE — H&P
Hospital Medicine History and Physical    Date of Service  12/7/2017    Chief Complaint  Chief Complaint   Patient presents with   • Facial Swelling       History of Presenting Illness  47 y.o. male who presented 12/7/2017 with left upper facial swelling. Mr. Mallory has a hx of tobacco use that developed left upper jaw pain and swelling two days ago. Despite keflex and bactrim, his symptoms worsened. Mr. Mallory has had a x-ray here in the emergency room has received IV antibiotics. Dr. Scott, oral surgery, has been consulted from the emergency room and plans on taking him to surgery in the morning. Mr. Mallory states that he is very anxious to smoke and is withdrawing from nicotine. He states the pain has been severe as requesting pain medications.   Primary Care Physician  Pcp Pt States None    Consultants  [place]    Code Status  full    Review of Systems  Review of Systems   Constitutional: Negative for chills and fever.   Respiratory: Negative for cough and shortness of breath.    Cardiovascular:        Chronic bilateral lower extremity swelling during the days and resolves at night.   Gastrointestinal: Negative for vomiting.   All other systems reviewed and are negative.       Past Medical History  No past medical history on file.    Surgical History  Past Surgical History:   Procedure Laterality Date   • PB OPEN POST RX CERV VERT FX,1 LVL      c4-c5       Medications  No current facility-administered medications on file prior to encounter.      Current Outpatient Prescriptions on File Prior to Encounter   Medication Sig Dispense Refill   • lorazepam (ATIVAN) 0.5 MG Tab Take 0.5 mg by mouth every four hours as needed for Anxiety.         Family History  No family history on file.    Social History  Social History   Substance Use Topics   • Smoking status: Current Every Day Smoker     Packs/day: 0.50     Types: Cigarettes   • Smokeless tobacco: Not on file      Comment: 3/4 a pack   • Alcohol use Yes       Comment: 6 pack a day   he works as a .    Allergies  No Known Allergies     Physical Exam  Laboratory   Hemodynamics  Temp (24hrs), Av.4 °C (97.5 °F), Min:36.4 °C (97.5 °F), Max:36.4 °C (97.5 °F)   Temperature: 36.4 °C (97.5 °F)  Pulse  Av.5  Min: 91  Max: 113    Blood Pressure: 160/94, NIBP: (!) 177/106      Respiratory      Respiration: 18, Pulse Oximetry: 97 %             Physical Exam   Constitutional: He is oriented to person, place, and time. No distress.   HENT:   Left ear is red.  Very significant swelling of the left maxilla that is indurated and tender to the touch. He cannot open his mouth well on the left side. Left upper lip is swollen.    Neck: Neck supple.   Cardiovascular: Normal rate and regular rhythm.    No murmur heard.  Pulmonary/Chest: Effort normal. No respiratory distress. He has no wheezes.   Abdominal: Soft. He exhibits no distension.   Musculoskeletal:   2+ edema bilaterally.   Lymphadenopathy:     He has no cervical adenopathy.   Neurological: He is alert and oriented to person, place, and time.   Skin: Skin is warm and dry. He is not diaphoretic.   Psychiatric: He has a normal mood and affect. His behavior is normal.       Recent Labs      17   1240   WBC  10.5   RBC  4.64*   HEMOGLOBIN  15.5   HEMATOCRIT  45.6   MCV  98.3*   MCH  33.4*   MCHC  34.0   RDW  44.8   PLATELETCT  358   MPV  8.6*     Recent Labs      17   1240   SODIUM  137   POTASSIUM  3.9   CHLORIDE  102   CO2  27   GLUCOSE  118*   BUN  9   CREATININE  0.93   CALCIUM  9.2     Recent Labs      17   1240   ALTSGPT  10   ASTSGOT  14   ALKPHOSPHAT  68   TBILIRUBIN  1.0   GLUCOSE  118*     Recent Labs      17   1240   INR  1.08             Lab Results   Component Value Date    TROPONINI <0.01 10/09/2017     Urinalysis:    Lab Results  Component Value Date/Time   SPECGRAVITY 1.018 10/04/2005 1005   GLUCOSEUR Negative 10/04/2005 1005   KETONES Negative 10/04/2005 1005   NITRITE  Negative 10/04/2005 1005        Imaging  [id number]   Final Result      Periapical lucency involving the innermost right mandibular molar tooth could relate to dental decay.           Assessment/Plan     I anticipate this patient will require at least two midnights for appropriate medical management, necessitating inpatient admission.    Dental abscess- (present on admission)   Assessment & Plan    A panoramic xray was done in the ER.   Dr. Edwards, oral surgery, has been consulted and plans on surgery in the morning.   IV clindamycin.   Oral and IV pain medications have been ordered.         Bilateral lower extremity edema- (present on admission)   Assessment & Plan    This is a chronic condition.   THOMPSON hose will be added.         Tobacco abuse- (present on admission)   Assessment & Plan    Tobacco cessation was discussed at length.  A nicotine patch will be offered.            VTE prophylaxis: SCDs.

## 2017-12-08 NOTE — OR NURSING
Report called to Ana M CROSS. Plan of care discussed. Patient denies pain, ice pack in place. No complaints of nausea. VSS.

## 2017-12-08 NOTE — PROGRESS NOTES
[facility] Hospitalist Progress Note    Date of Service: 2017    Chief Complaint  47 y.o. male admitted 2017 with dental abscess.    Interval Problem Update  S/P #14 tooth extraction.  He's hungry and would like to eat.    Consultants/Specialty  F surgeon.      Review of Systems   Constitutional: Negative for fever.   HENT: Negative for sore throat.    Gastrointestinal: Negative for nausea and vomiting.   All other systems reviewed and are negative.     Physical Exam  Laboratory/Imaging   Hemodynamics  Temp (24hrs), Av.8 °C (98.3 °F), Min:36.1 °C (97 °F), Max:37.3 °C (99.2 °F)   Temperature: 36.7 °C (98 °F)  Pulse  Av.7  Min: 85  Max: 113 Heart Rate (Monitored): 94  Blood Pressure: 144/98, NIBP: 138/91      Respiratory      Respiration: 18, Pulse Oximetry: 98 %        RUL Breath Sounds: Clear, RML Breath Sounds: Clear, RLL Breath Sounds: Clear, KALIE Breath Sounds: Clear, LLL Breath Sounds: Clear    Fluids    Intake/Output Summary (Last 24 hours) at 17 1310  Last data filed at 17 1200   Gross per 24 hour   Intake             1312 ml   Output                0 ml   Net             1312 ml       Nutrition  Orders Placed This Encounter   Procedures   • Diet NPO     Standing Status:   Standing     Number of Occurrences:   8     Order Specific Question:   Restrict to:     Answer:   Sips with Medications [3]     Physical Exam  Nursing note and vitals reviewed.  Constitutional: He is oriented to person, place, and time. He appears well-developed,  well-nourished and overweight.   HENT:   Head: Normocephalic and atraumatic.   Right Ear: External ear normal.   Left Ear: External ear normal.   Nose: Nose normal.   Mouth/Throat: Oropharynx is small with Mallanpati score of 2-3.  Mucosa is clear and moist.   Eyes: Conjunctivae and extraocular motions are normal. Pupils are equal, round, and reactive to light.   Neck: Normal range of motion. Neck supple.   Cardiovascular: Normal rate, regular rhythm,  normal heart sounds and intact distal pulses.    Pulmonary/Chest: Effort normal and breath sounds normal.   Abdominal: Soft. Bowel sounds are normal.   Musculoskeletal: Normal range of motion.   Neurological: He is alert and oriented to person, place, and time.   Skin: Skin is warm and dry.       Recent Labs      12/07/17   1240   WBC  10.5   RBC  4.64*   HEMOGLOBIN  15.5   HEMATOCRIT  45.6   MCV  98.3*   MCH  33.4*   MCHC  34.0   RDW  44.8   PLATELETCT  358   MPV  8.6*     Recent Labs      12/07/17   1240   SODIUM  137   POTASSIUM  3.9   CHLORIDE  102   CO2  27   GLUCOSE  118*   BUN  9   CREATININE  0.93   CALCIUM  9.2     Recent Labs      12/07/17   1240   INR  1.08                  Assessment/Plan     Dental abscess- (present on admission)   Assessment & Plan    S/P #14 extraction.  Cont IV Abx.  OMF on.        Alcohol abuse   Assessment & Plan    Cessation ed.  CIWA protocol.        Infected dental carries   Assessment & Plan    As above.  Dental hygiene education.        HTN (hypertension)   Assessment & Plan    Decrease IVF rate and start Coreg.        Bilateral lower extremity edema- (present on admission)   Assessment & Plan    Eval for DVT.  THOMPSON/SCD's if neg for DVT.        Tobacco abuse- (present on admission)   Assessment & Plan    Cessation ed.  Nicoderm.        Preventives - IS, Vax, stool soft, DVTP.    Dispo - complex/guarded.      Reviewed items::  Radiology images reviewed, Labs reviewed and Medications reviewed  Lopez catheter::  No Lopez  DVT prophylaxis pharmacological::  Heparin  Ulcer Prophylaxis::  Not indicated  Antibiotics:  Treating active infection/contamination beyond 24 hours perioperative coverage

## 2017-12-08 NOTE — PROGRESS NOTES
0200: Pneumonia VAX and Influenza VAX both given.     0400: Patient given surgical CHG wipedown    0600: Surgical checklist done and Consents printed and attached to chart. Report given to OR. All belongings removed from patient. IV unhooked. Patients' personal items are in Medicine  a blue bag with his stickers attached per request. Patient is ready for surgery.

## 2017-12-08 NOTE — PROGRESS NOTES
Received report from PAC-U.  Pt arrived to T411 bed 2  No immediate distress.  A&Ox4  States pain is tolerable at this time  Denies n/v  Oriented to room, white board, and call light.  Call light and personal belongings within reach.  Fall precautions and hourly rounding in place    2 RN skin check done.  No skin issues noted.

## 2017-12-09 NOTE — DISCHARGE INSTRUCTIONS
Discharge Instructions    Discharged to home by car with friend. Discharged via walking, hospital escort: Yes.  Special equipment needed: Not Applicable    Be sure to schedule a follow-up appointment with your primary care doctor or any specialists as instructed.     Discharge Plan:   Diet Plan: Discussed  Activity Level: Discussed  Smoking Cessation Offered: Patient Refused  Confirmed Follow up Appointment: Patient to Call and Schedule Appointment  Confirmed Symptoms Management: Discussed  Medication Reconciliation Updated: Yes  Pneumococcal Vaccine Given - only chart on this line when given: Given (See MAR)  Influenza Vaccine Indication: Indicated: 9 to 64 years of age  Influenza Vaccine Given - only chart on this line when given: Influenza Vaccine Given (See MAR)    I understand that a diet low in cholesterol, fat, and sodium is recommended for good health. Unless I have been given specific instructions below for another diet, I accept this instruction as my diet prescription.   Other diet: soft diet chewing food on right side and rinse mouth after you eat    Special Instructions: None    · Is patient discharged on Warfarin / Coumadin?   No     · Is patient Post Blood Transfusion?  No      Dental Abscess  A dental abscess is a collection of pus in or around a tooth.  CAUSES  This condition is caused by a bacterial infection around the root of the tooth that involves the inner part of the tooth (pulp). It may result from:  · Severe tooth decay.  · Trauma to the tooth that allows bacteria to enter into the pulp, such as a broken or chipped tooth.  · Severe gum disease around a tooth.  SYMPTOMS  Symptoms of this condition include:  · Severe pain in and around the infected tooth.  · Swelling and redness around the infected tooth, in the mouth, or in the face.  · Tenderness.  · Pus drainage.  · Bad breath.  · Bitter taste in the mouth.  · Difficulty swallowing.  · Difficulty opening the  mouth.  · Nausea.  · Vomiting.  · Chills.  · Swollen neck glands.  · Fever.  DIAGNOSIS  This condition is diagnosed with examination of the infected tooth. During the exam, your dentist may tap on the infected tooth. Your dentist will also ask about your medical and dental history and may order X-rays.  TREATMENT  This condition is treated by eliminating the infection. This may be done with:  · Antibiotic medicine.  · A root canal. This may be performed to save the tooth.  · Pulling (extracting) the tooth. This may also involve draining the abscess. This is done if the tooth cannot be saved.  HOME CARE INSTRUCTIONS  · Take medicines only as directed by your dentist.  · If you were prescribed antibiotic medicine, finish all of it even if you start to feel better.  · Rinse your mouth (gargle) often with salt water to relieve pain or swelling.  · Do not drive or operate heavy machinery while taking pain medicine.  · Do not apply heat to the outside of your mouth.  · Keep all follow-up visits as directed by your dentist. This is important.  SEEK MEDICAL CARE IF:  · Your pain is worse and is not helped by medicine.  SEEK IMMEDIATE MEDICAL CARE IF:  · You have a fever or chills.  · Your symptoms suddenly get worse.  · You have a very bad headache.  · You have problems breathing or swallowing.  · You have trouble opening your mouth.  · You have swelling in your neck or around your eye.     This information is not intended to replace advice given to you by your health care provider. Make sure you discuss any questions you have with your health care provider.     Document Released: 12/18/2006 Document Revised: 05/03/2016 Document Reviewed: 12/15/2015  [employer] Interactive Patient Education ©2016 [employer]    Dental Extraction  A dental extraction is the removal (extraction) of a tooth. You may need to have a dental extraction if:   · You have tooth decay or gum disease.  · You have an infection (abscess).  · Room needs  to be made for other teeth to grow in or to be aligned properly.  · Baby (primary) teeth are preventing adult (permanent) teeth from coming to the surface (erupting).  · You have a tooth fracture or fractures that are not repairable.  · You are going to be having radiation to your head and neck.  The type and length of procedure that you have depends on the reason for the extraction and the placement of the tooth or teeth that are being removed. The procedure may be:  · A simple extraction. This is done if the tooth is visible in the mouth and is above the gumline.  · A surgical extraction. This is done if the tooth has not come into the mouth or if the tooth is broken off below the gumline.  LET YOUR HEALTH CARE PROVIDER KNOW ABOUT:  · Any allergies you have.  · All medicines you are taking, including vitamins, herbs, eye drops, creams, and over-the-counter medicines.  · Previous problems you or members of your family have had with the use of anesthetics.  · Any blood disorders you have.  · Previous surgeries you have had.  · Any medical conditions you may have.  RISKS AND COMPLICATIONS  Generally, this is a safe procedure. However, problems may occur, including:  · Damage to surrounding teeth, nerves, tissues, or structures.  · The blood clot does not form or stay in place where the tooth was removed. This causes the bones and nerves underneath to be exposed (dry socket). This can delay healing.  · Incomplete extraction of roots.  · Jawbone injury, pain, or weakness.  BEFORE THE PROCEDURE  · Ask your health care provider about:  ¨ Changing or stopping your regular medicines. This is especially important if you are taking diabetes medicines or blood thinners.  ¨ Taking medicines such as aspirin and ibuprofen. These medicines can thin your blood. Do not take these medicines before your procedure if your health care provider instructs you not to.  · Take medicines, such as antibiotic medicines, as directed by your  health care provider.  · Follow instructions from your health care provider about eating or drinking restrictions.  · Plan to have someone take you home after the procedure.  · If you go home right after the procedure, plan to have someone with you for 24 hours.  PROCEDURE  · You may be given one or more of the following:  ¨ A medicine that helps you relax (sedative).  ¨ A medicine that numbs the area (local anesthetic).  ¨ A medicine that makes you fall asleep (general anesthetic).  · If you are having a simple extraction:  ¨ Your dentist will loosen the tooth with an instrument called an elevator.  ¨ Another instrument called forceps will be used to grasp the tooth and remove it from the socket.  ¨ The open socket will be cleaned.  ¨ Gauze will be placed in the socket to reduce bleeding.  · If you are having a surgical extraction:  ¨ Your dentist will make an incision in the gum.  ¨ Some of the bone around the tooth may need to be removed.  ¨ The tooth will be removed.  ¨ Stitches (sutures) may be required to close the area.  The procedure may vary among health care providers and hospitals.  AFTER THE PROCEDURE  · You may have gauze in your mouth where the tooth was removed. If directed by your health care provider, apply gentle pressure on the gauze for up to one hour after the procedure. This will help to control bleeding.  · A blood clot should begin to form over the open socket. This is normal. Do not touch the area, and do not rinse it.  · You may be given medicines to help control pain and help your recovery.     This information is not intended to replace advice given to you by your health care provider. Make sure you discuss any questions you have with your health care provider.     Document Released: 12/18/2006 Document Revised: 05/03/2016 Document Reviewed: 12/14/2015  [employer] Interactive Patient Education ©2016 [employer]            Depression / Suicide Risk    As you are discharged from [facility] facility, it is important to learn how to keep safe from harming yourself.    Recognize the warning signs:  · Abrupt changes in personality, positive or negative- including increase in energy   · Giving away possessions  · Change in eating patterns- significant weight changes-  positive or negative  · Change in sleeping patterns- unable to sleep or sleeping all the time   · Unwillingness or inability to communicate  · Depression  · Unusual sadness, discouragement and loneliness  · Talk of wanting to die  · Neglect of personal appearance   · Rebelliousness- reckless behavior  · Withdrawal from people/activities they love  · Confusion- inability to concentrate     If you or a loved one observes any of these behaviors or has concerns about self-harm, here's what you can do:  · Talk about it- your feelings and reasons for harming yourself  · Remove any means that you might use to hurt yourself (examples: pills, rope, extension cords, firearm)  · Get professional help from the community (Mental Health, Substance Abuse, psychological counseling)  · Do not be alone:Call your Safe Contact- someone whom you trust who will be there for you.  · Call your local CRISIS HOTLINE 919-2085 or 202-357-1585  · Call your local Children's Mobile Crisis Response Team [place] (612) 441-5148 or www.Spring  · Call the toll free National Suicide Prevention Hotlines   · National Suicide Prevention Lifeline 963-100-DCOU (3248)  · National Hope Line Network 800-SUICIDE (976-6120)

## 2017-12-09 NOTE — PROGRESS NOTES
Discharging Patient home per physician order.  Discharged with Friend.  Demonstrated understanding of discharge instructions, follow up appointments, prescriptions, home care for surgical wound and nursing care instructions.  Ambulating without assistance, voiding without difficulty, pain well controlled, tolerating oral medications, oxygen saturation greater than 90%, tolerating soft diet.  Educational handouts given and discussed.  Verbalized understanding of discharge instructions and educational handouts.  All questions answered.  Belongings with patient at time of discharge.

## 2017-12-20 NOTE — OP REPORT
DATE OF SERVICE:  12/07/2017    PREOPERATIVE DIAGNOSES:  Left canine space abscess/facial abscess, multiple   carious and abscessed teeth.    POSTOPERATIVE DIAGNOSES:  Abscessed tooth #14 with left vestibular space   abscess and left canine space abscess.    PROCEDURE:  Incision and drainage of left canine space abscess and left   vestibular abscess intraorally and surgical extraction of tooth #14.    OPERATION DESCRIPTION AND FINDINGS:  After a thorough discussion with the   patient about the risks, benefits, and alternatives of the procedure, the   patient gave written and verbal consent for incision and drainage of left   canine space abscess and extraction of any necessary teeth related to this.    Next, patient was brought back to the operating room in a supine position.    The anesthesia team brought the patient to adequate sedation level and   intubated the patient via general endo-nasotracheal technique.  See their   notes for complete details.  Next, patient was prepped and draped as usual for   procedure in the operating room.  Local anesthesia was deposited via routine   fashion.  Tooth #14 was surgically removed.  Abscess was drained from this   area using a #15 scalpel blade through the left maxillary vestibular mucosa   and then a Ericka hemostat to bluntly dissect to the subperiosteal plane,   purulence was drained from this area and then copious irrigation used to   irrigate out the extraction socket as well as the incision area.  Patient   tolerated the procedure well and was extubated in a stable condition.    SURGEON:  Niall Edwards DMD, MD    ANESTHESIOLOGIST:  Juma Yusuf MD    FLUIDS:  Approximately 500 mL normal saline.    SPECIMENS:  None.    DRESSINGS:  None.    DRAINS:  None.    COMPLICATIONS:  None.    CONDITION:  Patient was stable, extubated to PACU.    CONDITION FINDINGS:  Abscessed tooth #14 and left canine space abscess   consistent with diagnosis and preoperatively to  note, the patient has many   abscessed teeth on x-ray.  The only clinical abscess patient has with the left   canine space region from what appears to be tooth #14.  This tooth was mobile   and had an abscess along the gumline.       ____________________________________     JEANNA VIEIRA DMD, MD SMR / YUSEF    DD:  12/20/2017 12:14:29  DT:  12/20/2017 12:41:10    D#:  [id number]  Job#:  [id number]

## 2018-02-07 ENCOUNTER — OFFICE VISIT (OUTPATIENT)
Dept: MEDICAL GROUP | Facility: MEDICAL CENTER | Age: 48
End: 2018-02-07
Payer: COMMERCIAL

## 2018-02-07 DIAGNOSIS — I10 ESSENTIAL HYPERTENSION: ICD-10-CM

## 2018-02-07 DIAGNOSIS — I87.2 VENOUS STASIS DERMATITIS OF BOTH LOWER EXTREMITIES: ICD-10-CM

## 2018-02-07 DIAGNOSIS — Z00.00 PREVENTATIVE HEALTH CARE: ICD-10-CM

## 2018-02-07 DIAGNOSIS — F17.200 SMOKER: ICD-10-CM

## 2018-02-07 DIAGNOSIS — Z12.5 SCREENING FOR MALIGNANT NEOPLASM OF PROSTATE: ICD-10-CM

## 2018-02-07 DIAGNOSIS — I73.00 RAYNAUD'S DISEASE WITHOUT GANGRENE: ICD-10-CM

## 2018-02-07 DIAGNOSIS — R60.0 BILATERAL LOWER EXTREMITY EDEMA: ICD-10-CM

## 2018-02-07 DIAGNOSIS — M25.512 ACUTE PAIN OF LEFT SHOULDER: ICD-10-CM

## 2018-02-07 PROCEDURE — 99203 OFFICE O/P NEW LOW 30 MIN: CPT | Performed by: PHYSICIAN ASSISTANT

## 2018-02-07 RX ORDER — MELOXICAM 15 MG/1
15 TABLET ORAL DAILY
Qty: 30 TAB | Refills: 2 | Status: SHIPPED | OUTPATIENT
Start: 2018-02-07 | End: 2018-04-03 | Stop reason: SDUPTHER

## 2018-02-07 RX ORDER — LISINOPRIL AND HYDROCHLOROTHIAZIDE 25; 20 MG/1; MG/1
1 TABLET ORAL DAILY
Qty: 30 TAB | Refills: 1 | Status: SHIPPED | OUTPATIENT
Start: 2018-02-07 | End: 2018-02-28 | Stop reason: SDUPTHER

## 2018-02-07 ASSESSMENT — PATIENT HEALTH QUESTIONNAIRE - PHQ9: CLINICAL INTERPRETATION OF PHQ2 SCORE: 0

## 2018-02-08 VITALS
HEART RATE: 100 BPM | DIASTOLIC BLOOD PRESSURE: 110 MMHG | BODY MASS INDEX: 26.03 KG/M2 | OXYGEN SATURATION: 96 % | TEMPERATURE: 99.3 F | HEIGHT: 78 IN | WEIGHT: 225 LBS | SYSTOLIC BLOOD PRESSURE: 180 MMHG

## 2018-02-08 NOTE — ASSESSMENT & PLAN NOTE
Pt had elevated BP in hospital in Ec 2018 and  Lower leg swelling. He was started on Carvedilol bid.

## 2018-02-08 NOTE — PROGRESS NOTES
Cristofer Mallory is a 47 y.o. new male here for establishing care. Patient states he didn't have insurance and today used to go to doctors.      HPI:    Patient has elevated blood pressure hospital in Dec 2018 w  Lower leg swelling. He was started on Carvedilol 3.125 MG bid. Patient does have a lot of lower leg edema swelling with skin changes and discoloration in the skin peeling. Currently patient wears stocking socks. Does not check his BP at home.PHe denies HA, blurred vision, dizziness, shortness of breath, palpitations, or chest pain. Patient is a current every day smoker. States he has cut back on his smoking since he was in the hospital.  State couple months ago his right index finger suddenly got very painful, sharp shooting pain, with right below discoloration. He had some hemorrhage under the nail bed and eventually the skin around the tip of the finger peeled off. Patient states his fingers turn white and blue with cold and occasionally gets painful.   Pt states he hurt his L shoulder lifting heavy boxes 4 month ago. Currently can't lift arm above head, pain is constant 2/10 elevateds to 6-7/10 . Can't wash his R side of body.   Has tried ibuprofen w some help.             Current medicines (including changes today)  Current Outpatient Prescriptions   Medication Sig Dispense Refill   • lisinopril-hydrochlorothiazide (PRINZIDE, ZESTORETIC) 20-25 MG per tablet Take 1 Tab by mouth every day. 30 Tab 1   • meloxicam (MOBIC) 15 MG tablet Take 1 Tab by mouth every day. 30 Tab 2   • carvedilol (COREG) 3.125 MG Tab Take 1 Tab by mouth 2 times a day, with meals. 60 Tab 1   • amoxicillin-clavulanate (AUGMENTIN) 875-125 MG Tab Take 1 Tab by mouth 2 times a day. 14 Tab 0   • nicotine (NICODERM) 21 MG/24HR PATCH 24 HR Apply  to skin as directed. 30 Patch    • cyclobenzaprine (FLEXERIL) 10 MG Tab Take 10 mg by mouth 2 times a day as needed (prn).     • lorazepam (ATIVAN) 0.5 MG Tab Take 0.5 mg by mouth every four  "hours as needed for Anxiety.       No current facility-administered medications for this visit.      He  has a past medical history of Hypertension.  He  has a past surgical history that includes pr open post rx cerv vert fx,1 lvl; dental extraction(s) (2017); and incision and drainage general (2017).  Social History   Substance Use Topics   • Smoking status: Current Every Day Smoker     Packs/day: 0.50     Types: Cigarettes   • Smokeless tobacco: Former User     Types: Chew     Quit date:       Comment: Pt informed of cessation program   • Alcohol use 3.6 oz/week     6 Cans of beer per week      Comment: 6 pack a day     Social History     Social History Narrative   • No narrative on file     Family History   Problem Relation Age of Onset   • Arthritis Mother    • Heart Disease Mother      Family Status   Relation Status   • Mother Alive   • Father    • Sister Alive   • Brother        Past medical, surgical, family, and social history is reviewed in Epic chart by me today.   Medications and allergies reviewed in Epic chart by me today.       ROS  As documented in history of present illness               Objective:     Blood pressure (!) 180/110, pulse 100, temperature 37.4 °C (99.3 °F), height 1.981 m (6' 6\"), weight 102.1 kg (225 lb), SpO2 96 %. Body mass index is 26 kg/m².  Physical Exam:    Constitutional: Well-developed and well-nourished. No distress.   Skin: Skin is warm and dry. No rashes in visible areas.  Nail: w/o pitting, ridging or onychomycosis   Head: Atraumatic without lesions.  Eyes: Equal, round and reactive, conjunctiva clear,sclera non icteric, lids normal.  Ears:  External ears unremarkable. Tympanic membranes clear and intact.  Nose: Nares patent. Septum midline. Turbinates without erythema nor edema. No discharge.    Mouth/Throat: Dentition is good. Tongue normal. Oropharynx is clear and moist. Posterior pharynx without erythema or exudates.  Neck: Supple, trachea " midline.  No thyromegaly present. No lymphadenopathy--cervical or supraclavicular  Cardiovascular: Regular rate and rhythm, without any murmurs.  Pos lower extremity edema w skin discoloration  Lung:  Clear to auscultation throughout w/o any wheeze or rhonchi. No adventitious sounds.    Musculoskeletal:  Unable to lift l shoulder above head  Psychiatric:   Alert and oriented x3, normal affect and mood and behavior    Assessment and Plan:   The following treatment plan was discussed    1. Essential hypertension  Starting patient on blood pressure medication.  patient is going to check BP every morning as instructed and return with BP log and BP cuff in 7-10 days.   Due to lower extremity edema ordering echo    hlorothiazide (PRINZIDE, ZESTORETIC) 20-25 MG per tablet; Take 1 Tab by mouth every day.  Dispense: 30 Tab; Refill: 1  - ECHOCARDIOGRAM COMP W/O CONT; Future    2. Bilateral lower extremity edema    - lisinopril-hydrochlorothiazide (PRINZIDE, ZESTORETIC) 20-25 MG per tablet; Take 1 Tab by mouth every day.  Dispense: 30 Tab; Refill: 1  - ECHOCARDIOGRAM COMP W/O CONT; Future  - TSH WITH REFLEX TO FT4; Future  - REFERRAL TO VASCULAR SURGERY    3. Smoker  Unless patient to stop smoking  4. Preventative health care    - CBC WITH DIFFERENTIAL; Future  - COMP METABOLIC PANEL; Future  - LIPID PROFILE; Future  - TSH WITH REFLEX TO FT4; Future    5. Screening for malignant neoplasm of prostate    - PROSTATE SPECIFIC AG SCREENING; Future    6. Venous stasis dermatitis of both lower extremities    - REFERRAL TO VASCULAR SURGERY    7. Acute pain of left shoulder    - MR-SHOULDER-W/O LEFT; Future  - meloxicam (MOBIC) 15 MG tablet; Take 1 Tab by mouth every day.  Dispense: 30 Tab; Refill: 2    8. Raynaud's disease without gangrene    - REFERRAL TO VASCULAR SURGERY      Had a long discussion with patient that he needs to stop smoking. I suspect he has vuscular damage w possible Raynaud symptoms.  Continue compression  stockings, Protonix up. Good moisturizing over lower extremities for stasis dermatitis.    Followup: Return in about 10 days (around 2/17/2018).         Please note that this dictation was created using voice recognition software. I have made every reasonable attempt to correct obvious errors, but I expect that there are errors of grammar and possibly content that I did not discover before finalizing the note

## 2018-02-13 ENCOUNTER — TELEPHONE (OUTPATIENT)
Dept: MEDICAL GROUP | Facility: MEDICAL CENTER | Age: 48
End: 2018-02-13

## 2018-02-13 DIAGNOSIS — M25.512 ACUTE PAIN OF LEFT SHOULDER: ICD-10-CM

## 2018-02-14 PROBLEM — M25.512 ACUTE PAIN OF LEFT SHOULDER: Status: ACTIVE | Noted: 2018-02-14

## 2018-02-14 NOTE — TELEPHONE ENCOUNTER
1. Caller Name: Cristofer                                         Call Back Number: [phone] (home)       Patient approves a detailed voicemail message: N\A    Pt called stating that he is still in a lot of pain & discomfort and the medication Meloxicam is not working.      Requesting another med to be faxed to his pharmacy.    Please advise, thank you.

## 2018-02-14 NOTE — TELEPHONE ENCOUNTER
Please inform patient that I prescribed tylenol3, sent to [facility] pharmacy. Hopefully that helps with pain. If it does not we can try tramadol next. Also put a referral to our sports medicine Dr. Reyes for his shoulder pain.    Abby Eubanks P.A.-C.

## 2018-02-15 NOTE — TELEPHONE ENCOUNTER
Pt informed.    Per pt, states since he has been on the Bp medication Lisinopril when bending over & standing back up he gets dizzy.     Wondering if it normal?  Also would like to know if it is ok to take the Coreg with the Lisinopril?     Please advise, thank you.

## 2018-02-16 ENCOUNTER — HOSPITAL ENCOUNTER (OUTPATIENT)
Dept: CARDIOLOGY | Facility: MEDICAL CENTER | Age: 48
End: 2018-02-16
Attending: PHYSICIAN ASSISTANT
Payer: COMMERCIAL

## 2018-02-16 DIAGNOSIS — R60.0 BILATERAL LOWER EXTREMITY EDEMA: ICD-10-CM

## 2018-02-16 DIAGNOSIS — I10 ESSENTIAL HYPERTENSION: ICD-10-CM

## 2018-02-16 LAB
LV EJECT FRACT  99904: 60
LV EJECT FRACT MOD 2C 99903: 49.36
LV EJECT FRACT MOD 4C 99902: 65.92
LV EJECT FRACT MOD BP 99901: 64.28

## 2018-02-16 PROCEDURE — 93306 TTE W/DOPPLER COMPLETE: CPT

## 2018-02-16 PROCEDURE — 93306 TTE W/DOPPLER COMPLETE: CPT | Mod: 26 | Performed by: INTERNAL MEDICINE

## 2018-02-16 NOTE — TELEPHONE ENCOUNTER
Pt left a mess asking that we contact his Insurance for a prior authorization for the tylenol #3.  Abby, would you like for us to proceed with starting a PAR or change his rx??  Please advise.... :-)

## 2018-02-17 ENCOUNTER — HOSPITAL ENCOUNTER (OUTPATIENT)
Dept: LAB | Facility: MEDICAL CENTER | Age: 48
End: 2018-02-17
Attending: PHYSICIAN ASSISTANT
Payer: COMMERCIAL

## 2018-02-17 DIAGNOSIS — Z12.5 SCREENING FOR MALIGNANT NEOPLASM OF PROSTATE: ICD-10-CM

## 2018-02-17 DIAGNOSIS — R60.0 BILATERAL LOWER EXTREMITY EDEMA: ICD-10-CM

## 2018-02-17 DIAGNOSIS — Z00.00 PREVENTATIVE HEALTH CARE: ICD-10-CM

## 2018-02-17 LAB
ALBUMIN SERPL BCP-MCNC: 4.3 G/DL (ref 3.2–4.9)
ALBUMIN/GLOB SERPL: 1.5 G/DL
ALP SERPL-CCNC: 60 U/L (ref 30–99)
ALT SERPL-CCNC: 14 U/L (ref 2–50)
ANION GAP SERPL CALC-SCNC: 10 MMOL/L (ref 0–11.9)
AST SERPL-CCNC: 20 U/L (ref 12–45)
BASOPHILS # BLD AUTO: 1.2 % (ref 0–1.8)
BASOPHILS # BLD: 0.12 K/UL (ref 0–0.12)
BILIRUB SERPL-MCNC: 0.6 MG/DL (ref 0.1–1.5)
BUN SERPL-MCNC: 24 MG/DL (ref 8–22)
CALCIUM SERPL-MCNC: 9.9 MG/DL (ref 8.5–10.5)
CHLORIDE SERPL-SCNC: 103 MMOL/L (ref 96–112)
CHOLEST SERPL-MCNC: 175 MG/DL (ref 100–199)
CO2 SERPL-SCNC: 23 MMOL/L (ref 20–33)
CREAT SERPL-MCNC: 1.12 MG/DL (ref 0.5–1.4)
EOSINOPHIL # BLD AUTO: 0.15 K/UL (ref 0–0.51)
EOSINOPHIL NFR BLD: 1.5 % (ref 0–6.9)
ERYTHROCYTE [DISTWIDTH] IN BLOOD BY AUTOMATED COUNT: 43.9 FL (ref 35.9–50)
GLOBULIN SER CALC-MCNC: 2.9 G/DL (ref 1.9–3.5)
GLUCOSE SERPL-MCNC: 118 MG/DL (ref 65–99)
HCT VFR BLD AUTO: 46.6 % (ref 42–52)
HDLC SERPL-MCNC: 56 MG/DL
HGB BLD-MCNC: 16.2 G/DL (ref 14–18)
IMM GRANULOCYTES # BLD AUTO: 0.06 K/UL (ref 0–0.11)
IMM GRANULOCYTES NFR BLD AUTO: 0.6 % (ref 0–0.9)
LDLC SERPL CALC-MCNC: 100 MG/DL
LYMPHOCYTES # BLD AUTO: 1.8 K/UL (ref 1–4.8)
LYMPHOCYTES NFR BLD: 18.1 % (ref 22–41)
MCH RBC QN AUTO: 33 PG (ref 27–33)
MCHC RBC AUTO-ENTMCNC: 34.8 G/DL (ref 33.7–35.3)
MCV RBC AUTO: 94.9 FL (ref 81.4–97.8)
MONOCYTES # BLD AUTO: 0.65 K/UL (ref 0–0.85)
MONOCYTES NFR BLD AUTO: 6.5 % (ref 0–13.4)
NEUTROPHILS # BLD AUTO: 7.18 K/UL (ref 1.82–7.42)
NEUTROPHILS NFR BLD: 72.1 % (ref 44–72)
NRBC # BLD AUTO: 0 K/UL
NRBC BLD-RTO: 0 /100 WBC
PLATELET # BLD AUTO: 379 K/UL (ref 164–446)
PMV BLD AUTO: 9.1 FL (ref 9–12.9)
POTASSIUM SERPL-SCNC: 3.6 MMOL/L (ref 3.6–5.5)
PROT SERPL-MCNC: 7.2 G/DL (ref 6–8.2)
PSA SERPL-MCNC: 0.83 NG/ML (ref 0–4)
RBC # BLD AUTO: 4.91 M/UL (ref 4.7–6.1)
SODIUM SERPL-SCNC: 136 MMOL/L (ref 135–145)
TRIGL SERPL-MCNC: 95 MG/DL (ref 0–149)
TSH SERPL DL<=0.005 MIU/L-ACNC: 0.93 UIU/ML (ref 0.38–5.33)
WBC # BLD AUTO: 10 K/UL (ref 4.8–10.8)

## 2018-02-17 PROCEDURE — 80061 LIPID PANEL: CPT

## 2018-02-17 PROCEDURE — 36415 COLL VENOUS BLD VENIPUNCTURE: CPT

## 2018-02-17 PROCEDURE — 84443 ASSAY THYROID STIM HORMONE: CPT

## 2018-02-17 PROCEDURE — 84153 ASSAY OF PSA TOTAL: CPT

## 2018-02-17 PROCEDURE — 85025 COMPLETE CBC W/AUTO DIFF WBC: CPT

## 2018-02-17 PROCEDURE — 80053 COMPREHEN METABOLIC PANEL: CPT

## 2018-02-18 ENCOUNTER — HOSPITAL ENCOUNTER (OUTPATIENT)
Dept: RADIOLOGY | Facility: MEDICAL CENTER | Age: 48
End: 2018-02-18
Attending: PHYSICIAN ASSISTANT
Payer: COMMERCIAL

## 2018-02-18 DIAGNOSIS — M25.512 ACUTE PAIN OF LEFT SHOULDER: ICD-10-CM

## 2018-02-18 PROCEDURE — 73221 MRI JOINT UPR EXTREM W/O DYE: CPT | Mod: LT

## 2018-02-20 ENCOUNTER — TELEPHONE (OUTPATIENT)
Dept: MEDICAL GROUP | Facility: MEDICAL CENTER | Age: 48
End: 2018-02-20

## 2018-02-20 DIAGNOSIS — R60.0 BILATERAL LOWER EXTREMITY EDEMA: ICD-10-CM

## 2018-02-20 DIAGNOSIS — I10 ESSENTIAL HYPERTENSION: ICD-10-CM

## 2018-02-20 NOTE — TELEPHONE ENCOUNTER
----- Message from Abby Eubanks P.A.-C. sent at 2/20/2018 12:31 PM PST -----  Please inform patient that I reviewed his Echo cardiogram. No significant abnormal findings. I am referring him to cardiology to further get evaluated.  We will discuss this further at our next appointment.   Abby Eubanks P.A.-C.

## 2018-02-20 NOTE — TELEPHONE ENCOUNTER
----- Message from Abby Eubanks P.A.-C. sent at 2/20/2018 11:58 AM PST -----  Please inform patient that hi slabs look stable.  His blood sugar is elevated for fasting but normal if he was not fasting.  His white blood count has improved and it closer to normal range compared to prior lab.    Abby Eubanks P.A.-C.

## 2018-02-23 ENCOUNTER — OFFICE VISIT (OUTPATIENT)
Dept: MEDICAL GROUP | Facility: CLINIC | Age: 48
End: 2018-02-23
Payer: COMMERCIAL

## 2018-02-23 VITALS
WEIGHT: 225 LBS | RESPIRATION RATE: 18 BRPM | HEIGHT: 78 IN | TEMPERATURE: 98.2 F | DIASTOLIC BLOOD PRESSURE: 98 MMHG | BODY MASS INDEX: 26.03 KG/M2 | OXYGEN SATURATION: 98 % | SYSTOLIC BLOOD PRESSURE: 142 MMHG | HEART RATE: 102 BPM

## 2018-02-23 DIAGNOSIS — M75.82 ROTATOR CUFF TENDINITIS, LEFT: ICD-10-CM

## 2018-02-23 DIAGNOSIS — M24.112 DEGENERATIVE TEAR OF GLENOID LABRUM OF LEFT SHOULDER: ICD-10-CM

## 2018-02-23 DIAGNOSIS — M19.012 GLENOHUMERAL ARTHRITIS, LEFT: ICD-10-CM

## 2018-02-23 PROCEDURE — 20610 DRAIN/INJ JOINT/BURSA W/O US: CPT | Mod: LT | Performed by: FAMILY MEDICINE

## 2018-02-23 PROCEDURE — 99203 OFFICE O/P NEW LOW 30 MIN: CPT | Mod: 25 | Performed by: FAMILY MEDICINE

## 2018-02-23 RX ORDER — TRIAMCINOLONE ACETONIDE 40 MG/ML
40 INJECTION, SUSPENSION INTRA-ARTICULAR; INTRAMUSCULAR ONCE
Status: COMPLETED | OUTPATIENT
Start: 2018-02-23 | End: 2018-02-23

## 2018-02-23 RX ADMIN — TRIAMCINOLONE ACETONIDE 40 MG: 40 INJECTION, SUSPENSION INTRA-ARTICULAR; INTRAMUSCULAR at 18:18

## 2018-02-24 NOTE — PROGRESS NOTES
"CHIEF COMPLAINT:  Cristofer Mallory male presenting at the request of Corey Loco M.D. for evaluation of Shoulderpain.     Cristofer Mallory is complaining of left shoulder pain, but now some right pain as well (right handed)  present for 4 months, mid-October 2017  No  Acute injury  Pain is at the anterior, deltoid region and superior  Quality is aching, sharp  Pain is Radiating down arm NOT past elbow  Aggravated by movement and overhead activity   Improved with  rest   occasional prior pain without significant known history of injury  Prior Treatments: cortisone injection in LEFT shoulder \"9 yrs ago\", no prior PT for the shoulder  Prior studies: MRI LEFT shoulder  Medications tried for pain include:  lidocaine patches   Mechanical Symptom history: No Locking    REVIEW OF SYSTEMS  No Nausea, No Vomiting, No Chest Pain, No Shortness of Breath, No Headache, Dizziness with new BP medications    PAST MEDICAL HISTORY:   History reviewed. No pertinent past medical history.    PMH:  has a past medical history of Hypertension.  MEDS:   Current Outpatient Prescriptions:   •  acetaminophen-codeine #3 (TYLENOL #3) 300-30 MG Tab, Take 1-2 Tabs by mouth every 6 hours as needed for up to 10 days., Disp: 30 Tab, Rfl: 1  •  lisinopril-hydrochlorothiazide (PRINZIDE, ZESTORETIC) 20-25 MG per tablet, Take 1 Tab by mouth every day., Disp: 30 Tab, Rfl: 1  •  meloxicam (MOBIC) 15 MG tablet, Take 1 Tab by mouth every day., Disp: 30 Tab, Rfl: 2  •  carvedilol (COREG) 3.125 MG Tab, Take 1 Tab by mouth 2 times a day, with meals., Disp: 60 Tab, Rfl: 1  •  amoxicillin-clavulanate (AUGMENTIN) 875-125 MG Tab, Take 1 Tab by mouth 2 times a day., Disp: 14 Tab, Rfl: 0  •  nicotine (NICODERM) 21 MG/24HR PATCH 24 HR, Apply  to skin as directed., Disp: 30 Patch, Rfl:   •  cyclobenzaprine (FLEXERIL) 10 MG Tab, Take 10 mg by mouth 2 times a day as needed (prn)., Disp: , Rfl:   •  lorazepam (ATIVAN) 0.5 MG Tab, Take 0.5 mg by mouth " "every four hours as needed for Anxiety., Disp: , Rfl:   ALLERGIES: No Known Allergies  SURGHX:   Past Surgical History:   Procedure Laterality Date   • DENTAL EXTRACTION(S)  12/8/2017    Procedure: DENTAL EXTRACTION(S);  Surgeon: Niall Edwards DMD, M.D.;  Location: SURGERY [facility];  Service: Oral Surgery   • INCISION AND DRAINAGE GENERAL  12/8/2017    Procedure: INCISION AND DRAINAGE GENERAL- FACIAL ABSCESS;  Surgeon: Niall Edwards DMD, M.D.;  Location: SURGERY [facility];  Service: Oral Surgery   • PB OPEN POST RX CERV VERT FX,1 LVL      c4-c5     SOCHX:  reports that he has been smoking Cigarettes.  He has been smoking about 0.50 packs per day. He quit smokeless tobacco use about 20 years ago. His smokeless tobacco use included Chew. He reports that he drinks about 3.6 oz of alcohol per week . He reports that he uses drugs, including Marijuana.  FH: Family history was reviewed, no pertinent findings to report     PHYSICAL EXAM:  /98   Pulse (!) 102   Temp 36.8 °C (98.2 °F)   Resp 18   Ht 1.981 m (6' 6\")   Wt 102.1 kg (225 lb)   SpO2 98%   BMI 26.00 kg/m²       well-developed, well-nourished in no apparent distress, alert and oriented x 3.  Gait: normal    Cervical spine:  Range of motion Intact, Markedly limited with Extension and Markedllimited with Lateral rotation  Spurling's testing is NEGATIVE but there is some local cervical spine discomfort  Cervical spine tenderness NEGATIVE    Strength testing:     Deltoid, bilateral 5/5  Bicep, bilateral 5/5  Tricep, bilateral 5/5  Wrist Extension, bilateral 5/5  Wrist Flexion, bilateral 5/5  Finger Abduction, bilateral 5/5    Sensation:  DECREASED on the left at the level of C5         Reflexes:   Biceps: R 2+/L 2+  Triceps: R 2+/L  2+  Brachial radialis R 2+/L  2+  Hartmann's testing is NEGATIVE  The arms are otherwise neurovascularly intact     Shoulder Exam:    RIGHT Shoulder:  No visible swelling   Range of motion INTACT  Tenderness: " Supraspinatous tenderness  Empty Can Testing 5/5  Internal Rotation 5/5  External Rotation 5/5  Lift Off Testing 5/5  Impingement testing Granger  NEGATIVE  Neer's testing NEGATIVE  Apprehension testing NEGATIVE  Relocation testing NEGATIVE  Harper's Testing NEGATIVE  Grind Testing NEGATIVE      LEFT Shoulder:  No visible swelling   Range of motion DIMINISHED with pain  Tenderness: Supraspinatous tenderness  Empty Can Testing 5/5  Internal Rotation 5/5  External Rotation 5/5  Lift Off Testing 5/5  Impingement testing Granger  POSITIVE  Neer's testing POSITIVE  Apprehension testing POSITIVE  Relocation testing NEGATIVE  Harper's Testing NEGATIVE  Grind Testing NEGATIVE     Additional Findings: Flexed Posture      1. Rotator cuff tendinitis, left  triamcinolone acetonide (KENALOG-40) injection 40 mg    REFERRAL TO PHYSICAL THERAPY Reason for Therapy: Eval/Treat/Report   2. Glenohumeral arthritis, left     3. Degenerative tear of glenoid labrum of left shoulder       MRI interpreted in office today with patient  LEFT subacromial injection TODAY (2/23/18)  Referred for PT    PROCEDURE NOTE:  left Shoulder subacromial injection  Risks and benefits discussed  Informed consent obtained  Shoulder prepped in sterile fashion utilizing a posterior approach  40 mg of Kenalog and 5 cc of Marcaine injected into the subacromial space  Vapocoolant spray was utilized  Patient tolerated the procedure well  Postprocedure care and reflex discussed    Return in about 4 weeks (around 3/23/2018). To see how is doing with his formal physical therapy and after his LEFT subacromial corticosteroid injection    2/18/2018 1:44 PM    HISTORY/REASON FOR EXAM:  Shoulder pain following trauma  Left shoulder injury in OCT 18', progressively worse    TECHNIQUE/EXAM DESCRIPTION:  MRI of the LEFT shoulder without contrast.    The study was performed on a Agustin 3.0 [place]ie MRI scanner. T1 sagittal, fast spin-echo T2 fat-suppressed oblique coronal,  sagittal, and axial and intermediate fast spin-echo oblique coronal images were obtained.    COMPARISON:  None.    FINDINGS:    ROTATOR CUFF:  The supraspinatus, infraspinatus, teres minor, tendons are intact. Tendinosis of the subscapularis tendon  No rotator cuff muscle atrophy or fatty infiltration.    GLENOHUMERAL JOINT:  - Labrum: Degeneration and possible tearing of the superior labrum.  - Cartilage: Mild diffuse cartilage thinning.  - Bone: Minimal osteophytosis. Resorptive cystic change in the footprint of the subscapularis tendon. No Hill-Sachs or osseous Bankart, or other fracture or osseous contusion.      LONG HEAD OF BICEPS TENDON:  Intact intra-and extra-articular portions.    SUBACROMIAL-SUBDELTOID BURSA:  Moderate effusion.    ACROMIOCLAVICULAR JOINT:  Mild osteoarthritis.    __________________________________   Impression           1. Tendinosis of the subscapularis tendon. No rotator cuff tear.    2. Moderate subacromial subdeltoid bursitis.    3. Degeneration and possible tearing of the superior labrum.    4. Mild osteoarthritis of the glenohumeral joint.     done elsewhere and reviewed independently by me    Thank you Corey Loco M.D. for allowing me to participate in caring for your patient.

## 2018-02-28 ENCOUNTER — OFFICE VISIT (OUTPATIENT)
Dept: MEDICAL GROUP | Facility: MEDICAL CENTER | Age: 48
End: 2018-02-28
Payer: COMMERCIAL

## 2018-02-28 VITALS
OXYGEN SATURATION: 100 % | BODY MASS INDEX: 25.92 KG/M2 | HEIGHT: 78 IN | SYSTOLIC BLOOD PRESSURE: 140 MMHG | TEMPERATURE: 98.3 F | DIASTOLIC BLOOD PRESSURE: 90 MMHG | WEIGHT: 224 LBS | HEART RATE: 96 BPM | RESPIRATION RATE: 18 BRPM

## 2018-02-28 DIAGNOSIS — R60.0 BILATERAL LOWER EXTREMITY EDEMA: ICD-10-CM

## 2018-02-28 DIAGNOSIS — I10 ESSENTIAL HYPERTENSION: ICD-10-CM

## 2018-02-28 DIAGNOSIS — I87.2 VENOUS STASIS DERMATITIS OF BOTH LOWER EXTREMITIES: ICD-10-CM

## 2018-02-28 DIAGNOSIS — I87.2 VENOUS INSUFFICIENCY OF BOTH LOWER EXTREMITIES: ICD-10-CM

## 2018-02-28 DIAGNOSIS — R73.9 ELEVATED BLOOD SUGAR: ICD-10-CM

## 2018-02-28 DIAGNOSIS — F41.9 ANXIETY: ICD-10-CM

## 2018-02-28 LAB
HBA1C MFR BLD: 5.5 % (ref ?–5.8)
INT CON NEG: NORMAL
INT CON POS: NORMAL

## 2018-02-28 PROCEDURE — 99214 OFFICE O/P EST MOD 30 MIN: CPT | Performed by: PHYSICIAN ASSISTANT

## 2018-02-28 PROCEDURE — 83036 HEMOGLOBIN GLYCOSYLATED A1C: CPT | Performed by: PHYSICIAN ASSISTANT

## 2018-02-28 RX ORDER — AMLODIPINE BESYLATE 5 MG/1
5 TABLET ORAL DAILY
Qty: 30 TAB | Refills: 1 | Status: SHIPPED | OUTPATIENT
Start: 2018-02-28 | End: 2018-04-03 | Stop reason: SDUPTHER

## 2018-02-28 RX ORDER — ALPRAZOLAM 0.5 MG/1
0.5 TABLET ORAL NIGHTLY PRN
Qty: 30 TAB | Refills: 0 | Status: SHIPPED
Start: 2018-02-28 | End: 2018-03-30

## 2018-03-01 PROBLEM — I87.2 VENOUS INSUFFICIENCY OF BOTH LOWER EXTREMITIES: Status: ACTIVE | Noted: 2018-03-01

## 2018-03-01 PROBLEM — K02.9 INFECTED DENTAL CARRIES: Status: RESOLVED | Noted: 2017-12-08 | Resolved: 2018-03-01

## 2018-03-01 PROBLEM — K04.7 INFECTED DENTAL CARRIES: Status: RESOLVED | Noted: 2017-12-08 | Resolved: 2018-03-01

## 2018-03-01 RX ORDER — LISINOPRIL AND HYDROCHLOROTHIAZIDE 25; 20 MG/1; MG/1
1 TABLET ORAL DAILY
Qty: 30 TAB | Refills: 1 | Status: SHIPPED | OUTPATIENT
Start: 2018-03-01 | End: 2018-04-03 | Stop reason: SDUPTHER

## 2018-03-01 RX ORDER — CARVEDILOL 3.12 MG/1
3.12 TABLET ORAL 2 TIMES DAILY WITH MEALS
Qty: 60 TAB | Refills: 1 | Status: SHIPPED | OUTPATIENT
Start: 2018-03-01 | End: 2018-05-01 | Stop reason: SDUPTHER

## 2018-03-01 NOTE — PROGRESS NOTES
Subjective:   CC:   Chief Complaint   Patient presents with   • Hypertension     Follow up & , review on echo & Labs    • Laryngitis     cough x 5 days       Cristofer Mallory is a 47 y.o. male here today for f/u:    HTN (hypertension)  H is currently taking carvedilol 3.125, lisinopril- hydrochlorothiazide 20-25 MG daily. He denies HA, blurred vision, dizziness, shortness of breath, palpitations.   Blood pressure has improved however still elevated 142/98 --> 140/90  Patient was seen by vascular medicine for lower leg edema and skin discoloration. He was diagnosed with venous insufficiency. currently wears compression socking. Possible vascular surgery in 6 months.     Patient has noticed a new painless White lesion over the L side of the roof of the tongue X 7 days. Pt is an everyday smoker. Has not noticed any change in size or color.    Patient has lost his wife due to breast cancer about 17 months ago. States each month around the same day his wife passed away he gets  very anxious and irritable and smokes more. He becomes short tempered and not nice to people around him.         Current medicines (including changes today)  Current Outpatient Prescriptions   Medication Sig Dispense Refill   • acetaminophen-codeine #3 (TYLENOL #3) 300-30 MG Tab Take 1-2 Tabs by mouth every four hours as needed.     • amLODIPine (NORVASC) 5 MG Tab Take 1 Tab by mouth every day. 30 Tab 1   • ALPRAZolam (XANAX) 0.5 MG Tab Take 1 Tab by mouth at bedtime as needed for Anxiety for up to 30 days. 30 Tab 0   • lisinopril-hydrochlorothiazide (PRINZIDE, ZESTORETIC) 20-25 MG per tablet Take 1 Tab by mouth every day. 30 Tab 1   • meloxicam (MOBIC) 15 MG tablet Take 1 Tab by mouth every day. 30 Tab 2   • carvedilol (COREG) 3.125 MG Tab Take 1 Tab by mouth 2 times a day, with meals. 60 Tab 1   • amoxicillin-clavulanate (AUGMENTIN) 875-125 MG Tab Take 1 Tab by mouth 2 times a day. 14 Tab 0   • nicotine (NICODERM) 21 MG/24HR PATCH 24 HR  "Apply  to skin as directed. 30 Patch    • cyclobenzaprine (FLEXERIL) 10 MG Tab Take 10 mg by mouth 2 times a day as needed (prn).     • lorazepam (ATIVAN) 0.5 MG Tab Take 0.5 mg by mouth every four hours as needed for Anxiety.       No current facility-administered medications for this visit.          Past medical, surgical, family, and social history are reviewed in Epic chart by me today.   Medications and allergies reviewed in Epic chart by me today.         ROS   No chest pain, no shortness of breath, no abdominal pain  As documented in history of present illness above     Objective:     Blood pressure 140/90, pulse 96, temperature 36.8 °C (98.3 °F), resp. rate 18, height 1.981 m (6' 5.99\"), weight 101.6 kg (224 lb), SpO2 100 %. Body mass index is 25.89 kg/m².   Physical Exam:  Constitutional: Alert, oriented in no acute distress.  Psych: Eye contact is good, speech goal directed, affect calm  Eyes: Conjunctiva non-injected, sclera non-icteric.  Skin: no lesions in visible areas.  Ext: edema has improved, skin discoloration   Last lab results were reviewed by me today and discussed w patient.      Assessment and Plan:   The following treatment plan was discussed    1. Essential hypertension  Adding amlodipine 5 MG. Discussed with patient amlodipine can cause lower leg edema. Stop if any SE or worsening leg edema  - amLODIPine (NORVASC) 5 MG Tab; Take 1 Tab by mouth every day.  Dispense: 30 Tab; Refill: 1    2. Elevated blood sugar  - POCT  A1C --> non diabetic    3. Anxiety  Discussed with patient not to take this medicine with alcohol  - ALPRAZolam (XANAX) 0.5 MG Tab; Take 1 Tab by mouth at bedtime as needed for Anxiety for up to 30 days.  Dispense: 30 Tab; Refill: 0    4. Bilateral lower extremity edema  Improved, continue with compression stockings, follow-up vascular surgery.      Followup: No Follow-up on file.         Please note that this dictation was created using voice recognition software. I have made " every reasonable attempt to correct obvious errors, but I expect that there are errors of grammar and possibly content that I did not discover before finalizing the note.

## 2018-03-01 NOTE — TELEPHONE ENCOUNTER
Was the patient seen in the last year in this department? Yes     Does patient have an active prescription for medications requested? No     Received Request Via: Pharmacy     Pt requesting that all his meds be sent to his new px.    Please advise, thank you.

## 2018-03-01 NOTE — ASSESSMENT & PLAN NOTE
H is currently taking carvedilol 3.125, lisinopril- hydrochlorothiazide 20-25 MG daily. He denies HA, blurred vision, dizziness, shortness of breath, palpitations.

## 2018-03-05 ENCOUNTER — APPOINTMENT (OUTPATIENT)
Dept: PHYSICAL THERAPY | Facility: REHABILITATION | Age: 48
End: 2018-03-05
Attending: FAMILY MEDICINE
Payer: COMMERCIAL

## 2018-03-09 ENCOUNTER — TELEPHONE (OUTPATIENT)
Dept: MEDICAL GROUP | Facility: MEDICAL CENTER | Age: 48
End: 2018-03-09

## 2018-03-09 NOTE — TELEPHONE ENCOUNTER
Patient called today and states he has been leaving message with no return call. Patient states he is needing medication for pain. I looked in patients chart for any messages and or refills. Nothing in chart that patient has been calling. I left a VM for patient to return our call for medication he is needing. I explained to patient if pain Is unbearable to please proceed to UC as Abby is not here to day and wont return till Tuesday

## 2018-03-23 ENCOUNTER — OFFICE VISIT (OUTPATIENT)
Dept: MEDICAL GROUP | Facility: CLINIC | Age: 48
End: 2018-03-23
Payer: COMMERCIAL

## 2018-03-23 VITALS
TEMPERATURE: 98.1 F | HEART RATE: 104 BPM | BODY MASS INDEX: 26.03 KG/M2 | OXYGEN SATURATION: 99 % | SYSTOLIC BLOOD PRESSURE: 130 MMHG | WEIGHT: 225 LBS | HEIGHT: 78 IN | DIASTOLIC BLOOD PRESSURE: 100 MMHG

## 2018-03-23 DIAGNOSIS — M19.012 GLENOHUMERAL ARTHRITIS, LEFT: ICD-10-CM

## 2018-03-23 DIAGNOSIS — S43.432S LABRAL TEAR OF SHOULDER, LEFT, SEQUELA: ICD-10-CM

## 2018-03-23 DIAGNOSIS — M75.82 ROTATOR CUFF TENDINITIS, LEFT: ICD-10-CM

## 2018-03-23 PROCEDURE — 99213 OFFICE O/P EST LOW 20 MIN: CPT | Performed by: FAMILY MEDICINE

## 2018-03-24 NOTE — PROGRESS NOTES
"CHIEF COMPLAINT:  FOLLOW up for LEFT Shoulderpain.     Cristofer Mallory is complaining of left shoulder pain, but now some right pain as well (right handed)  present for 4 months, mid-October 2017  No  Acute injury  Pain is improved, but persists at the anterior, deltoid region and superior  Quality is aching, sharp  Pain is Radiating down arm NOT past elbow  Aggravated by movement and overhead activity   Improved with  rest   occasional prior pain without significant known history of injury    Prior Treatments: cortisone injection in LEFT shoulder \"9 yrs ago\", no prior PT for the shoulder   Did NOT attend PT due to poor coverage and high out of pocket expense  Prior studies: MRI LEFT shoulder  Medications tried for pain include:  lidocaine patches   Mechanical Symptom history: No Locking    REVIEW OF SYSTEMS  No Nausea, No Vomiting, No Chest Pain, No Shortness of Breath, No Headache, Dizziness with new BP medications    PAST MEDICAL HISTORY:   History reviewed. No pertinent past medical history.    PMH:  has a past medical history of Hypertension.  MEDS:   Current Outpatient Prescriptions:   •  lisinopril-hydrochlorothiazide (PRINZIDE, ZESTORETIC) 20-25 MG per tablet, Take 1 Tab by mouth every day., Disp: 30 Tab, Rfl: 1  •  carvedilol (COREG) 3.125 MG Tab, Take 1 Tab by mouth 2 times a day, with meals., Disp: 60 Tab, Rfl: 1  •  acetaminophen-codeine #3 (TYLENOL #3) 300-30 MG Tab, Take 1-2 Tabs by mouth every four hours as needed., Disp: , Rfl:   •  amLODIPine (NORVASC) 5 MG Tab, Take 1 Tab by mouth every day., Disp: 30 Tab, Rfl: 1  •  ALPRAZolam (XANAX) 0.5 MG Tab, Take 1 Tab by mouth at bedtime as needed for Anxiety for up to 30 days., Disp: 30 Tab, Rfl: 0  •  meloxicam (MOBIC) 15 MG tablet, Take 1 Tab by mouth every day., Disp: 30 Tab, Rfl: 2  •  amoxicillin-clavulanate (AUGMENTIN) 875-125 MG Tab, Take 1 Tab by mouth 2 times a day., Disp: 14 Tab, Rfl: 0  •  nicotine (NICODERM) 21 MG/24HR PATCH 24 HR, Apply  " "to skin as directed., Disp: 30 Patch, Rfl:   •  cyclobenzaprine (FLEXERIL) 10 MG Tab, Take 10 mg by mouth 2 times a day as needed (prn)., Disp: , Rfl:   •  lorazepam (ATIVAN) 0.5 MG Tab, Take 0.5 mg by mouth every four hours as needed for Anxiety., Disp: , Rfl:   ALLERGIES: No Known Allergies  SURGHX:   Past Surgical History:   Procedure Laterality Date   • DENTAL EXTRACTION(S)  12/8/2017    Procedure: DENTAL EXTRACTION(S);  Surgeon: Niall Edwards DMD, M.D.;  Location: SURGERY [facility];  Service: Oral Surgery   • INCISION AND DRAINAGE GENERAL  12/8/2017    Procedure: INCISION AND DRAINAGE GENERAL- FACIAL ABSCESS;  Surgeon: Niall Edwards DMD, M.D.;  Location: SURGERY [facility];  Service: Oral Surgery   • PB OPEN POST RX CERV VERT FX,1 LVL      c4-c5     SOCHX:  reports that he has been smoking Cigarettes.  He has been smoking about 0.50 packs per day. He quit smokeless tobacco use about 20 years ago. His smokeless tobacco use included Chew. He reports that he drinks about 3.6 oz of alcohol per week . He reports that he uses drugs, including Marijuana.  FH: Family history was reviewed, no pertinent findings to report     PHYSICAL EXAM:  /100   Pulse (!) 104   Temp 36.7 °C (98.1 °F)   Ht 1.981 m (6' 6\")   Wt 102.1 kg (225 lb)   SpO2 99%   BMI 26.00 kg/m²      well-developed, well-nourished in no apparent distress, alert and oriented x 3.  Gait: normal    Shoulder Exam:    RIGHT Shoulder:  No visible swelling   Range of motion INTACT  Tenderness: Supraspinatous tenderness  Empty Can Testing 5/5  Internal Rotation 5/5  External Rotation 5/5  Lift Off Testing 5/5  Impingement testing Granger  NEGATIVE  Neer's testing NEGATIVE  Apprehension testing NEGATIVE  Relocation testing NEGATIVE  Harper's Testing NEGATIVE  Grind Testing NEGATIVE      LEFT Shoulder:  No visible swelling   Range of motion NEAR NORMAL with pain at extreme abduction  Tenderness: Supraspinatous tenderness  Empty Can " Testing 5/5  Internal Rotation 5/5  External Rotation 5/5  Lift Off Testing 5/5  Impingement testing Granger  POSITIVE  Neer's testing POSITIVE  Apprehension testing POSITIVE  Relocation testing NEGATIVE  Harper's Testing NEGATIVE  Grind Testing NEGATIVE     Additional Findings: Flexed Posture      1. Rotator cuff tendinitis, left     2. Glenohumeral arthritis, left     3. Labral tear of shoulder, left, sequela          MRI interpreted in office today with patient  MINIMAL improvement with LEFT subacromial injection (2/23/18)  Referred for PT, but Did NOT attend PT due to poor coverage and high out of pocket expense    Home exercise program provided today  instructed to start exercises    Return in about 4 weeks (around 4/20/2018). To see how is doing with his HOME EXERCISE PROGRAM, since he had minimal improvement with his LEFT subacromial corticosteroid injection, we can consider GH injection instead since he has evidence of GH arthritis on MRI    2/18/2018 1:44 PM    HISTORY/REASON FOR EXAM:  Shoulder pain following trauma  Left shoulder injury in OCT 18', progressively worse    TECHNIQUE/EXAM DESCRIPTION:  MRI of the LEFT shoulder without contrast.    The study was performed on a Agustin 3.0 [place] MRI scanner. T1 sagittal, fast spin-echo T2 fat-suppressed oblique coronal, sagittal, and axial and intermediate fast spin-echo oblique coronal images were obtained.    COMPARISON:  None.    FINDINGS:    ROTATOR CUFF:  The supraspinatus, infraspinatus, teres minor, tendons are intact. Tendinosis of the subscapularis tendon  No rotator cuff muscle atrophy or fatty infiltration.    GLENOHUMERAL JOINT:  - Labrum: Degeneration and possible tearing of the superior labrum.  - Cartilage: Mild diffuse cartilage thinning.  - Bone: Minimal osteophytosis. Resorptive cystic change in the footprint of the subscapularis tendon. No Hill-Sachs or osseous Bankart, or other fracture or osseous contusion.      LONG HEAD OF BICEPS  TENDON:  Intact intra-and extra-articular portions.    SUBACROMIAL-SUBDELTOID BURSA:  Moderate effusion.    ACROMIOCLAVICULAR JOINT:  Mild osteoarthritis.    __________________________________   Impression       1. Tendinosis of the subscapularis tendon. No rotator cuff tear.    2. Moderate subacromial subdeltoid bursitis.    3. Degeneration and possible tearing of the superior labrum.    4. Mild osteoarthritis of the glenohumeral joint.     done elsewhere and reviewed independently by me    Thank you Corey Loco M.D. for allowing me to participate in caring for your patient.

## 2018-04-03 DIAGNOSIS — M25.512 ACUTE PAIN OF LEFT SHOULDER: ICD-10-CM

## 2018-04-03 DIAGNOSIS — I10 ESSENTIAL HYPERTENSION: ICD-10-CM

## 2018-04-03 DIAGNOSIS — R60.0 BILATERAL LOWER EXTREMITY EDEMA: ICD-10-CM

## 2018-04-03 NOTE — TELEPHONE ENCOUNTER
Was the patient seen in the last year in this department? Yes     Does patient have an active prescription for medications requested? No     Received Request Via: Pharmacy      scanned in media

## 2018-04-04 ENCOUNTER — OFFICE VISIT (OUTPATIENT)
Dept: CARDIOLOGY | Facility: MEDICAL CENTER | Age: 48
End: 2018-04-04
Payer: COMMERCIAL

## 2018-04-04 ENCOUNTER — TELEPHONE (OUTPATIENT)
Dept: CARDIOLOGY | Facility: MEDICAL CENTER | Age: 48
End: 2018-04-04

## 2018-04-04 VITALS
HEART RATE: 108 BPM | BODY MASS INDEX: 26.27 KG/M2 | SYSTOLIC BLOOD PRESSURE: 136 MMHG | HEIGHT: 78 IN | WEIGHT: 227 LBS | DIASTOLIC BLOOD PRESSURE: 84 MMHG | OXYGEN SATURATION: 95 %

## 2018-04-04 DIAGNOSIS — I10 ESSENTIAL HYPERTENSION: ICD-10-CM

## 2018-04-04 DIAGNOSIS — R60.0 LOCALIZED EDEMA: ICD-10-CM

## 2018-04-04 PROCEDURE — 99243 OFF/OP CNSLTJ NEW/EST LOW 30: CPT | Performed by: INTERNAL MEDICINE

## 2018-04-04 RX ORDER — LISINOPRIL AND HYDROCHLOROTHIAZIDE 25; 20 MG/1; MG/1
1 TABLET ORAL DAILY
Qty: 30 TAB | Refills: 1 | Status: SHIPPED | OUTPATIENT
Start: 2018-04-04 | End: 2018-05-29 | Stop reason: SDUPTHER

## 2018-04-04 RX ORDER — AMLODIPINE BESYLATE 5 MG/1
5 TABLET ORAL DAILY
Qty: 30 TAB | Refills: 1 | Status: SHIPPED | OUTPATIENT
Start: 2018-04-04 | End: 2018-04-04

## 2018-04-04 RX ORDER — MELOXICAM 15 MG/1
15 TABLET ORAL DAILY
Qty: 30 TAB | Refills: 2 | Status: SHIPPED | OUTPATIENT
Start: 2018-04-04

## 2018-04-04 RX ORDER — ALPRAZOLAM 0.5 MG/1
0.5 TABLET ORAL NIGHTLY PRN
COMMUNITY
End: 2018-04-19 | Stop reason: SDUPTHER

## 2018-04-04 ASSESSMENT — ENCOUNTER SYMPTOMS
NAUSEA: 0
PALPITATIONS: 0
WEAKNESS: 0
NEUROLOGICAL NEGATIVE: 1
CONSTITUTIONAL NEGATIVE: 1
SHORTNESS OF BREATH: 0
VOMITING: 0

## 2018-04-04 NOTE — PROGRESS NOTES
Chief Complaint   Patient presents with   • Edema       Subjective:   Chief complaint: Edema  Cristofer Mallory is a 47 y.o. male who is seen in consultation today at the request of Abby Earl PA-C for evaluation above problem. The patient has a history of hypertension which she's had for several years. Does not get chest pain or chest pressure. His cardiac risk factor profile is positive for cigarette smoking but negative for diabetes, hyperlipidemia or family history of heart disease.  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 He had pallor and skin loss of his distal right index finger and oftentimes has rubor of his toes by history.  The patient is a long-term cigarette smoker since age 15 and smoked much of  pack and a half of cigarettes a day. Is now down to half-pack of cigarettes. He also drinks a quart of beer a night. Parenthetically, the patient comes in today with a 2 L bottle of Mountain Dew that he drinks during the day.  His wife recently passed away from breast cancer. The patient does recall his wife told him previously that he did snore loudly. He has been evaluated for sleep apnea.  Past Medical History:   Diagnosis Date   • Hypertension      Past Surgical History:   Procedure Laterality Date   • DENTAL EXTRACTION(S)  12/8/2017    Procedure: DENTAL EXTRACTION(S);  Surgeon: Niall Edwards DMD, M.D.;  Location: SURGERY [facility];  Service: Oral Surgery   • INCISION AND DRAINAGE GENERAL  12/8/2017    Procedure: INCISION AND DRAINAGE GENERAL- FACIAL ABSCESS;  Surgeon: Niall Edwards DMD, M.D.;  Location: SURGERY [facility];  Service: Oral Surgery   • PB OPEN POST RX CERV VERT FX,1 LVL      c4-c5     Family History   Problem Relation Age of Onset   • Arthritis Mother    • Heart Disease Mother      Social History     Social History   • Marital status:      Spouse name: N/A   • Number of children: N/A   • Years of education: N/A      Occupational History   • Not on file.     Social History Main Topics   • Smoking status: Current Every Day Smoker     Packs/day: 0.50     Types: Cigarettes   • Smokeless tobacco: Former User     Types: Chew     Quit date: 1998      Comment: Pt informed of cessation program   • Alcohol use 3.6 oz/week     6 Cans of beer per week      Comment: 6 pack a day   • Drug use: Yes     Types: Marijuana      Comment: pot once a week   • Sexual activity: Yes     Other Topics Concern   • Not on file     Social History Narrative   • No narrative on file     No Known Allergies  Outpatient Encounter Prescriptions as of 4/4/2018   Medication Sig Dispense Refill   • lisinopril-hydrochlorothiazide (PRINZIDE, ZESTORETIC) 20-25 MG per tablet Take 1 Tab by mouth every day. 30 Tab 1   • meloxicam (MOBIC) 15 MG tablet Take 1 Tab by mouth every day. 30 Tab 2   • ALPRAZolam (XANAX) 0.5 MG Tab Take 0.5 mg by mouth at bedtime as needed for Sleep.     • acetaminophen-codeine #3 (TYLENOL #3) 300-30 MG Tab Take 1 Tab by mouth every 8 hours as needed for up to 30 days. 30 Tab 0   • carvedilol (COREG) 3.125 MG Tab Take 1 Tab by mouth 2 times a day, with meals. 60 Tab 1   • [DISCONTINUED] amLODIPine (NORVASC) 5 MG Tab Take 1 Tab by mouth every day. 30 Tab 1   • amoxicillin-clavulanate (AUGMENTIN) 875-125 MG Tab Take 1 Tab by mouth 2 times a day. 14 Tab 0   • nicotine (NICODERM) 21 MG/24HR PATCH 24 HR Apply  to skin as directed. 30 Patch    • cyclobenzaprine (FLEXERIL) 10 MG Tab Take 10 mg by mouth 2 times a day as needed (prn).     • lorazepam (ATIVAN) 0.5 MG Tab Take 0.5 mg by mouth every four hours as needed for Anxiety.       No facility-administered encounter medications on file as of 4/4/2018.      Review of Systems   Constitutional: Negative.  Negative for malaise/fatigue.   HENT: Negative.    Respiratory: Negative for shortness of breath.    Cardiovascular: Positive for leg swelling. Negative for chest pain and palpitations.  "  Gastrointestinal: Negative for nausea and vomiting.   Musculoskeletal: Positive for joint pain.   Neurological: Negative.  Negative for weakness.   All other systems reviewed and are negative.       Objective:   /84   Pulse (!) 108   Ht 1.981 m (6' 6\")   Wt 103 kg (227 lb)   SpO2 95%   BMI 26.23 kg/m²     Physical Exam   Constitutional: He is oriented to person, place, and time. No distress.   HENT:   Head: Normocephalic and atraumatic.   Cardiovascular: Normal rate and regular rhythm.    No murmur heard.  Pulmonary/Chest: Breath sounds normal. No respiratory distress. He has no wheezes.   Abdominal: Soft. Bowel sounds are normal. He exhibits no distension.   Musculoskeletal: He exhibits edema.   1+ edema bilaterally   Neurological: He is alert and oriented to person, place, and time.   Skin: Skin is warm and dry.   Venous stasis changes of the pretibial areas   Psychiatric: He has a normal mood and affect.       Assessment:     1. Localized edema  OVERNIGHT PULSE OXIMETRY   2. Essential hypertension         Medical Decision Making:  Today's Assessment / Status / Plan:   Edema: The patient has evidence of mild edema and chronic venous stasis changes. His echocardiogram from February 16, 2018, revealed normal left ventricular systolic and diastolic function, no valvular heart disease and no evidence of pulmonary hypertension.  I suspect his edema is multifactorial and due in part to excess water intake. As noted above he came in today to OT and a 2 L bottle of Mountain Dew with him. We discussed reducing his total fluid intake including beer and Mountain Dew.  I suspect he does have sleep apnea. An overnight oximetry will be ordered. He has been on amlodipine which will certainly exacerbate any dependent edema and I've asked him to stop this.    Suspected Raynaud's disease: I discussed the correlation of cigarette smoking and Raynaud's. I strongly urged him to quit smoking altogether.      Hypertension: " Currently under good control on his current medical regimen.  An overnight oximetry will be scheduled and he will return as needed. I recommended he stop his amlodipine as outlined above.

## 2018-04-04 NOTE — TELEPHONE ENCOUNTER
Pt. Ins: Jeff  Per Dr RS would like this pt. To have an OPO done.  OPO faxed to [facility]  Fax: [phone]  Phone number: [phone]

## 2018-04-04 NOTE — LETTER
[facility]   [address] [place] [address]  [place] [address]  Phone: [phone]  Fax: [phone]              Cristofer Mallory  1970    Encounter Date: 4/4/2018    Kvng Burns M.D.          PROGRESS NOTE:  Chief Complaint   Patient presents with   • Edema       Subjective:   Chief complaint: Edema  Cristofer Mallory is a 47 y.o. male who is seen in consultation today at the request of Abby Earl PA-C for evaluation above problem. The patient has a history of hypertension which she's had for several years. Does not get chest pain or chest pressure. His cardiac risk factor profile is positive for cigarette smoking but negative for diabetes, hyperlipidemia or family history of heart disease.  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 He had pallor and skin loss of his distal right index finger and oftentimes has rubor of his toes by history.  The patient is a long-term cigarette smoker since age 15 and smoked much of  pack and a half of cigarettes a day. Is now down to half-pack of cigarettes. He also drinks a quart of beer a night. Parenthetically, the patient comes in today with a 2 L bottle of Mountain Dew that he drinks during the day.  His wife recently passed away from breast cancer. The patient does recall his wife told him previously that he did snore loudly. He has been evaluated for sleep apnea.  Past Medical History:   Diagnosis Date   • Hypertension      Past Surgical History:   Procedure Laterality Date   • DENTAL EXTRACTION(S)  12/8/2017    Procedure: DENTAL EXTRACTION(S);  Surgeon: Niall Edwards DMD, M.D.;  Location: SURGERY [facility];  Service: Oral Surgery   • INCISION AND DRAINAGE GENERAL  12/8/2017    Procedure: INCISION AND DRAINAGE GENERAL- FACIAL ABSCESS;  Surgeon: Niall Edwards DMD, M.D.;  Location: SURGERY [facility];  Service: Oral Surgery   • PB OPEN POST RX CERV  CORINA FX,1 LVL      c4-c5     Family History   Problem Relation Age of Onset   • Arthritis Mother    • Heart Disease Mother      Social History     Social History   • Marital status:      Spouse name: N/A   • Number of children: N/A   • Years of education: N/A     Occupational History   • Not on file.     Social History Main Topics   • Smoking status: Current Every Day Smoker     Packs/day: 0.50     Types: Cigarettes   • Smokeless tobacco: Former User     Types: Chew     Quit date: 1998      Comment: Pt informed of cessation program   • Alcohol use 3.6 oz/week     6 Cans of beer per week      Comment: 6 pack a day   • Drug use: Yes     Types: Marijuana      Comment: pot once a week   • Sexual activity: Yes     Other Topics Concern   • Not on file     Social History Narrative   • No narrative on file     No Known Allergies  Outpatient Encounter Prescriptions as of 4/4/2018   Medication Sig Dispense Refill   • lisinopril-hydrochlorothiazide (PRINZIDE, ZESTORETIC) 20-25 MG per tablet Take 1 Tab by mouth every day. 30 Tab 1   • meloxicam (MOBIC) 15 MG tablet Take 1 Tab by mouth every day. 30 Tab 2   • ALPRAZolam (XANAX) 0.5 MG Tab Take 0.5 mg by mouth at bedtime as needed for Sleep.     • acetaminophen-codeine #3 (TYLENOL #3) 300-30 MG Tab Take 1 Tab by mouth every 8 hours as needed for up to 30 days. 30 Tab 0   • carvedilol (COREG) 3.125 MG Tab Take 1 Tab by mouth 2 times a day, with meals. 60 Tab 1   • [DISCONTINUED] amLODIPine (NORVASC) 5 MG Tab Take 1 Tab by mouth every day. 30 Tab 1   • amoxicillin-clavulanate (AUGMENTIN) 875-125 MG Tab Take 1 Tab by mouth 2 times a day. 14 Tab 0   • nicotine (NICODERM) 21 MG/24HR PATCH 24 HR Apply  to skin as directed. 30 Patch    • cyclobenzaprine (FLEXERIL) 10 MG Tab Take 10 mg by mouth 2 times a day as needed (prn).     • lorazepam (ATIVAN) 0.5 MG Tab Take 0.5 mg by mouth every four hours as needed for Anxiety.       No facility-administered encounter medications on file  "as of 4/4/2018.      Review of Systems   Constitutional: Negative.  Negative for malaise/fatigue.   HENT: Negative.    Respiratory: Negative for shortness of breath.    Cardiovascular: Positive for leg swelling. Negative for chest pain and palpitations.   Gastrointestinal: Negative for nausea and vomiting.   Musculoskeletal: Positive for joint pain.   Neurological: Negative.  Negative for weakness.   All other systems reviewed and are negative.       Objective:   /84   Pulse (!) 108   Ht 1.981 m (6' 6\")   Wt 103 kg (227 lb)   SpO2 95%   BMI 26.23 kg/m²      Physical Exam   Constitutional: He is oriented to person, place, and time. No distress.   HENT:   Head: Normocephalic and atraumatic.   Cardiovascular: Normal rate and regular rhythm.    No murmur heard.  Pulmonary/Chest: Breath sounds normal. No respiratory distress. He has no wheezes.   Abdominal: Soft. Bowel sounds are normal. He exhibits no distension.   Musculoskeletal: He exhibits edema.   1+ edema bilaterally   Neurological: He is alert and oriented to person, place, and time.   Skin: Skin is warm and dry.   Venous stasis changes of the pretibial areas   Psychiatric: He has a normal mood and affect.       Assessment:     1. Localized edema  OVERNIGHT PULSE OXIMETRY   2. Essential hypertension         Medical Decision Making:  Today's Assessment / Status / Plan:   Edema: The patient has evidence of mild edema and chronic venous stasis changes. His echocardiogram from February 16, 2018, revealed normal left ventricular systolic and diastolic function, no valvular heart disease and no evidence of pulmonary hypertension.  I suspect his edema is multifactorial and due in part to excess water intake. As noted above he came in today to OT and a 2 L bottle of Mountain Dew with him. We discussed reducing his total fluid intake including beer and Mountain Dew.  I suspect he does have sleep apnea. An overnight oximetry will be ordered. He has been on " amlodipine which will certainly exacerbate any dependent edema and I've asked him to stop this.    Suspected Raynaud's disease: I discussed the correlation of cigarette smoking and Raynaud's. I strongly urged him to quit smoking altogether.      Hypertension: Currently under good control on his current medical regimen.  An overnight oximetry will be scheduled and he will return as needed. I recommended he stop his amlodipine as outlined above.      CHRIS Bates.-C.  [address] [facility]  [address] [place] [id number]  VIA In Basket

## 2018-04-06 ENCOUNTER — OFFICE VISIT (OUTPATIENT)
Dept: MEDICAL GROUP | Facility: MEDICAL CENTER | Age: 48
End: 2018-04-06
Payer: COMMERCIAL

## 2018-04-06 VITALS
BODY MASS INDEX: 26.15 KG/M2 | SYSTOLIC BLOOD PRESSURE: 126 MMHG | TEMPERATURE: 99 F | HEART RATE: 104 BPM | DIASTOLIC BLOOD PRESSURE: 82 MMHG | OXYGEN SATURATION: 97 % | HEIGHT: 78 IN | WEIGHT: 225.97 LBS

## 2018-04-06 DIAGNOSIS — I10 ESSENTIAL HYPERTENSION: ICD-10-CM

## 2018-04-06 DIAGNOSIS — R60.0 BILATERAL LOWER EXTREMITY EDEMA: ICD-10-CM

## 2018-04-06 DIAGNOSIS — R23.1 LIVEDO RETICULARIS: ICD-10-CM

## 2018-04-06 PROBLEM — K04.7 DENTAL ABSCESS: Status: RESOLVED | Noted: 2017-12-07 | Resolved: 2018-04-06

## 2018-04-06 PROCEDURE — 99214 OFFICE O/P EST MOD 30 MIN: CPT | Performed by: PHYSICIAN ASSISTANT

## 2018-04-06 NOTE — PROGRESS NOTES
"Chief Complaint   Patient presents with   • Hypertension     Follow up & Cardiology appt       HPI  Cristofer Mallory is a 47 y.o. male here today for f/u:    Pt was seen by cardiology. He is currently taking lisinopril hydrochlorothiazide and carvedilol. His amlodipine 5 MG was discontinued due to lower extremity edema. Denies any severe CP.    Unfortunately for Lower leg edema he cannot have vascular surgery due to his insurance and copay. For non-B are to treat symptomatically, compression stocking, Protonix.    Patient states he gets changes in skin, looking like lace purple pattern. This happens without any treat years, cold or heat. It happens over the arms and over anterior thighs. Patient has features.    Past medical, surgical, family, and social history is reviewed in Epic chart by me today.   Medications and allergies reviewed and updated in Epic chart by me today.     ROS:   As documented in history of present illness above    Exam:  Blood pressure 126/82, pulse (!) 104, temperature 37.2 °C (99 °F), height 1.981 m (6' 5.99\"), weight 102.5 kg (225 lb 15.5 oz), SpO2 97 %.  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 Bilateral lower extremity edema    3. Essential hypertension  Controlled, discontinue amlodipine     F/u prn       "

## 2018-04-19 DIAGNOSIS — F41.9 ANXIETY: ICD-10-CM

## 2018-04-19 DIAGNOSIS — M25.512 ACUTE PAIN OF LEFT SHOULDER: ICD-10-CM

## 2018-04-19 RX ORDER — ALPRAZOLAM 0.5 MG/1
TABLET ORAL
Qty: 30 TAB | Refills: 2 | Status: SHIPPED
Start: 2018-04-19 | End: 2018-05-19

## 2018-04-24 ENCOUNTER — TELEPHONE (OUTPATIENT)
Dept: MEDICAL GROUP | Facility: MEDICAL CENTER | Age: 48
End: 2018-04-24

## 2018-04-24 DIAGNOSIS — M25.512 ACUTE PAIN OF LEFT SHOULDER: ICD-10-CM

## 2018-04-24 RX ORDER — DICLOFENAC SODIUM 75 MG/1
75 TABLET, DELAYED RELEASE ORAL 2 TIMES DAILY
Qty: 60 TAB | Refills: 3 | Status: SHIPPED | OUTPATIENT
Start: 2018-04-24

## 2018-04-24 NOTE — TELEPHONE ENCOUNTER
Patient is asking for refill on Tylenol #3 a stronger pain medicine for shoulder pain.   was checked by me today, last Tylenol 3 refill was on April 4, #30.    Please inform patient that because he is taking Xanax it is not recommended to give him any narcotics.  Also whoever is treating his shoulder pain should also be prescribing pain medicine for his shoulder pain, that would be Dr. Farah.   I am going to give him diclofenac which is an NSAID, anti-inflammatory, and also increase his Tylenol 3 to twice daily.  DO not take Diclofenac w meloxicam, ibuprofen or aleve.     I will send a new prescription for a month w 3 refills until he is seen by Dr. Farah.  Any further pain med refills for shoulder pain should come from him.    Abby Eubanks P.A.-C.

## 2018-05-01 RX ORDER — CARVEDILOL 3.12 MG/1
3.12 TABLET ORAL 2 TIMES DAILY WITH MEALS
Qty: 60 TAB | Refills: 1 | Status: SHIPPED | OUTPATIENT
Start: 2018-05-01

## 2018-05-03 ENCOUNTER — TELEPHONE (OUTPATIENT)
Dept: CARDIOLOGY | Facility: MEDICAL CENTER | Age: 48
End: 2018-05-03

## 2018-05-03 DIAGNOSIS — R09.89 ABNORMAL PULMONARY FINDING: ICD-10-CM

## 2018-05-03 NOTE — TELEPHONE ENCOUNTER
Message     The referral to Pulmonology will be sent to [facility]. The contact number is [phone].

## 2018-05-03 NOTE — TELEPHONE ENCOUNTER
----- Message from Kvng Burns M.D. sent at 5/3/2018  7:44 AM PDT -----  Failed OPO and sleep apnea likely contributing to his edema. Needs referral to pulmonary

## 2018-05-08 ENCOUNTER — APPOINTMENT (OUTPATIENT)
Dept: RADIOLOGY | Facility: IMAGING CENTER | Age: 48
End: 2018-05-08
Attending: FAMILY MEDICINE
Payer: COMMERCIAL

## 2018-05-08 ENCOUNTER — OFFICE VISIT (OUTPATIENT)
Dept: MEDICAL GROUP | Facility: CLINIC | Age: 48
End: 2018-05-08
Payer: COMMERCIAL

## 2018-05-08 VITALS
BODY MASS INDEX: 26.14 KG/M2 | RESPIRATION RATE: 18 BRPM | HEART RATE: 106 BPM | TEMPERATURE: 98.1 F | SYSTOLIC BLOOD PRESSURE: 122 MMHG | OXYGEN SATURATION: 96 % | WEIGHT: 225.94 LBS | DIASTOLIC BLOOD PRESSURE: 80 MMHG | HEIGHT: 78 IN

## 2018-05-08 DIAGNOSIS — S43.432S LABRAL TEAR OF SHOULDER, LEFT, SEQUELA: ICD-10-CM

## 2018-05-08 DIAGNOSIS — M75.81 RIGHT ROTATOR CUFF TENDINITIS: ICD-10-CM

## 2018-05-08 DIAGNOSIS — M19.012 GLENOHUMERAL ARTHRITIS, LEFT: ICD-10-CM

## 2018-05-08 DIAGNOSIS — M75.82 ROTATOR CUFF TENDINITIS, LEFT: ICD-10-CM

## 2018-05-08 PROCEDURE — 99214 OFFICE O/P EST MOD 30 MIN: CPT | Mod: 25 | Performed by: FAMILY MEDICINE

## 2018-05-08 PROCEDURE — 73030 X-RAY EXAM OF SHOULDER: CPT | Mod: TC,RT | Performed by: FAMILY MEDICINE

## 2018-05-08 PROCEDURE — 20610 DRAIN/INJ JOINT/BURSA W/O US: CPT | Performed by: FAMILY MEDICINE

## 2018-05-08 RX ORDER — TRIAMCINOLONE ACETONIDE 40 MG/ML
40 INJECTION, SUSPENSION INTRA-ARTICULAR; INTRAMUSCULAR ONCE
Status: COMPLETED | OUTPATIENT
Start: 2018-05-08 | End: 2018-05-08

## 2018-05-08 RX ADMIN — TRIAMCINOLONE ACETONIDE 40 MG: 40 INJECTION, SUSPENSION INTRA-ARTICULAR; INTRAMUSCULAR at 17:46

## 2018-05-08 NOTE — PROGRESS NOTES
"CHIEF COMPLAINT:  FOLLOW up for LEFT Shoulderpain    left shoulder pain, but now RIGHT shoulder pain is worse (right handed)  LEFT since present mid-October 2017  RIGHT since late March 2018  No  Acute injury  Pain is improved, but persists at the anterior, deltoid region and superior  Quality is aching, sharp  Pain is Radiating down arm NOT past elbow  Aggravated by movement and overhead activity   Improved with  rest   occasional prior pain without significant known history of injury   No prior known right shoulder issues    Prior Treatments: cortisone injection in LEFT shoulder \"9 yrs ago\", no prior PT for the shoulder   Did NOT attend PT due to poor coverage and high out of pocket expense  Prior studies: MRI LEFT shoulder  Medications tried for pain include:  lidocaine patches   Mechanical Symptom history: No Locking    REVIEW OF SYSTEMS  No Nausea, No Vomiting, No Chest Pain, No Shortness of Breath, No Headache, Dizziness with new BP medications    PAST MEDICAL HISTORY:   History reviewed. No pertinent past medical history.    PMH:  has a past medical history of Hypertension.  MEDS:   Current Outpatient Prescriptions:   •  carvedilol (COREG) 3.125 MG Tab, TAKE 1 TAB BY MOUTH 2 TIMES A DAY, WITH MEALS., Disp: 60 Tab, Rfl: 1  •  acetaminophen-codeine #3 (TYLENOL #3) 300-30 MG Tab, Take 1 Tab by mouth every 12 hours as needed for up to 30 days., Disp: 60 Tab, Rfl: 3  •  diclofenac EC (VOLTAREN) 75 MG Tablet Delayed Response, Take 1 Tab by mouth 2 times a day., Disp: 60 Tab, Rfl: 3  •  ALPRAZolam (XANAX) 0.5 MG Tab, TAKE 1 TABLET BY MOUTH AT BEDTIME AS NEEDED FOR ANXIETY FOR 30 DAYS F41.9, Disp: 30 Tab, Rfl: 2  •  lisinopril-hydrochlorothiazide (PRINZIDE, ZESTORETIC) 20-25 MG per tablet, Take 1 Tab by mouth every day., Disp: 30 Tab, Rfl: 1  •  meloxicam (MOBIC) 15 MG tablet, Take 1 Tab by mouth every day., Disp: 30 Tab, Rfl: 2  •  amoxicillin-clavulanate (AUGMENTIN) 875-125 MG Tab, Take 1 Tab by mouth 2 times a " "day., Disp: 14 Tab, Rfl: 0  •  nicotine (NICODERM) 21 MG/24HR PATCH 24 HR, Apply  to skin as directed., Disp: 30 Patch, Rfl:   •  cyclobenzaprine (FLEXERIL) 10 MG Tab, Take 10 mg by mouth 2 times a day as needed (prn)., Disp: , Rfl:   •  lorazepam (ATIVAN) 0.5 MG Tab, Take 0.5 mg by mouth every four hours as needed for Anxiety., Disp: , Rfl:   ALLERGIES: No Known Allergies  SURGHX:   Past Surgical History:   Procedure Laterality Date   • DENTAL EXTRACTION(S)  12/8/2017    Procedure: DENTAL EXTRACTION(S);  Surgeon: Niall Edwards DMD, M.D.;  Location: SURGERY [facility];  Service: Oral Surgery   • INCISION AND DRAINAGE GENERAL  12/8/2017    Procedure: INCISION AND DRAINAGE GENERAL- FACIAL ABSCESS;  Surgeon: Niall Edwards DMD, M.D.;  Location: SURGERY [facility];  Service: Oral Surgery   • PB OPEN POST RX CERV VERT FX,1 LVL      c4-c5     SOCHX:  reports that he has been smoking Cigarettes.  He has been smoking about 0.50 packs per day. He quit smokeless tobacco use about 20 years ago. His smokeless tobacco use included Chew. He reports that he drinks about 3.6 oz of alcohol per week . He reports that he uses drugs, including Marijuana.  FH: Family history was reviewed, no pertinent findings to report     PHYSICAL EXAM:  /80   Pulse (!) 106   Temp 36.7 °C (98.1 °F)   Resp 18   Ht 1.981 m (6' 5.99\")   Wt 102.5 kg (225 lb 15 oz)   SpO2 96%   BMI 26.12 kg/m²      well-developed, well-nourished in no apparent distress, alert and oriented x 3.  Gait: normal    Shoulder Exam:    RIGHT Shoulder:  No visible swelling   Range of motion NEAR NORMAL with pain at extreme abduction  Tenderness: Supraspinatous tenderness  Empty Can Testing 5/5  Internal Rotation 5/5 with PAIN  External Rotation 5/5  Lift Off Testing 5/5  Impingement testing Granger  NEGATIVE  Neer's testing NEGATIVE  Apprehension testing NEGATIVE  Relocation testing NEGATIVE  Harper's Testing NEGATIVE  Grind Testing NEGATIVE      LEFT " Shoulder:  No visible swelling   Range of motion NEAR NORMAL with pain at extreme abduction  Tenderness: Supraspinatous tenderness  Empty Can Testing 5/5  Internal Rotation 5/5  External Rotation 5/5  Lift Off Testing 5/5  Impingement testing Granger  POSITIVE  Neer's testing POSITIVE  Apprehension testing POSITIVE  Relocation testing NEGATIVE  Harper's Testing NEGATIVE  Grind Testing NEGATIVE     Additional Findings: Flexed Posture      1. Rotator cuff tendinitis, left     2. Glenohumeral arthritis, left     3. Labral tear of shoulder, left, sequela     4. Right rotator cuff tendinitis  DX-SHOULDER 2+ RIGHT    triamcinolone acetonide (KENALOG-40) injection 40 mg      NOW RIGHT > LEFT shoulder pain  Xray of RIGHT shoulder done in office TODAY (5/8/18)    MRI of the LEFT shoulder done  MODERATE improvement with LEFT subacromial injection (2/23/18) and HEP     Cannot afford PT out of pocket expense  Continue Home exercise program    PROCEDURE NOTE:  right Shoulder subacromial injection  Risks and benefits discussed  Informed consent obtained  Shoulder prepped in sterile fashion utilizing a posterior approach  40 mg of Kenalog and 5 cc of Marcaine injected into the subacromial space  Vapocoolant spray was utilized  Patient tolerated the procedure well  Postprocedure care and red flags discussed      Return in about 4 weeks (around 6/5/2018). To see how is doing with his HOME EXERCISE PROGRAM, and RIGHT subacromial corticosteroid injection    we can also consider GH injection of the LEFT since he has evidence of GH arthritis on MRI of the LEFT shoulder     5/8/2018 5:27 PM    HISTORY/REASON FOR EXAM:  Right shoulder pain      TECHNIQUE/EXAM DESCRIPTION AND NUMBER OF VIEWS:  3 views of the RIGHT shoulder.    COMPARISON: None    FINDINGS:  There is no evidence of displaced  fracture or dislocation.    There is mild degenerative change in the AC joint.    There are no displaced rib fractures in this field-of-view.    Impression       1.  There is minimal degenerative change right AC joint.     Interpreted in the office today with the patient    2/18/2018 1:44 PM    HISTORY/REASON FOR EXAM:  Shoulder pain following trauma  Left shoulder injury in OCT 18', progressively worse    TECHNIQUE/EXAM DESCRIPTION:  MRI of the LEFT shoulder without contrast.    The study was performed on a Agustin 3.0 Laurie MRI scanner. T1 sagittal, fast spin-echo T2 fat-suppressed oblique coronal, sagittal, and axial and intermediate fast spin-echo oblique coronal images were obtained.    COMPARISON:  None.    FINDINGS:    ROTATOR CUFF:  The supraspinatus, infraspinatus, teres minor, tendons are intact. Tendinosis of the subscapularis tendon  No rotator cuff muscle atrophy or fatty infiltration.    GLENOHUMERAL JOINT:  - Labrum: Degeneration and possible tearing of the superior labrum.  - Cartilage: Mild diffuse cartilage thinning.  - Bone: Minimal osteophytosis. Resorptive cystic change in the footprint of the subscapularis tendon. No Hill-Sachs or osseous Bankart, or other fracture or osseous contusion.      LONG HEAD OF BICEPS TENDON:  Intact intra-and extra-articular portions.    SUBACROMIAL-SUBDELTOID BURSA:  Moderate effusion.    ACROMIOCLAVICULAR JOINT:  Mild osteoarthritis.    __________________________________   Impression       1. Tendinosis of the subscapularis tendon. No rotator cuff tear.    2. Moderate subacromial subdeltoid bursitis.    3. Degeneration and possible tearing of the superior labrum.    4. Mild osteoarthritis of the glenohumeral joint.     Thank you Corey Loco M.D. for allowing me to participate in caring for your patient.

## 2018-05-17 ENCOUNTER — OFFICE VISIT (OUTPATIENT)
Dept: MEDICAL GROUP | Facility: MEDICAL CENTER | Age: 48
End: 2018-05-17
Payer: COMMERCIAL

## 2018-05-17 ENCOUNTER — HOSPITAL ENCOUNTER (OUTPATIENT)
Facility: MEDICAL CENTER | Age: 48
End: 2018-05-17
Attending: PHYSICIAN ASSISTANT
Payer: COMMERCIAL

## 2018-05-17 VITALS
WEIGHT: 221.78 LBS | HEART RATE: 103 BPM | SYSTOLIC BLOOD PRESSURE: 130 MMHG | BODY MASS INDEX: 25.66 KG/M2 | OXYGEN SATURATION: 92 % | HEIGHT: 78 IN | DIASTOLIC BLOOD PRESSURE: 82 MMHG | TEMPERATURE: 98.1 F

## 2018-05-17 DIAGNOSIS — G89.29 CHRONIC RIGHT SHOULDER PAIN: ICD-10-CM

## 2018-05-17 DIAGNOSIS — L03.032 CELLULITIS OF TOE OF LEFT FOOT: ICD-10-CM

## 2018-05-17 DIAGNOSIS — M25.511 CHRONIC RIGHT SHOULDER PAIN: ICD-10-CM

## 2018-05-17 PROCEDURE — 99214 OFFICE O/P EST MOD 30 MIN: CPT | Performed by: PHYSICIAN ASSISTANT

## 2018-05-17 PROCEDURE — 87070 CULTURE OTHR SPECIMN AEROBIC: CPT

## 2018-05-17 PROCEDURE — 87205 SMEAR GRAM STAIN: CPT

## 2018-05-17 PROCEDURE — 99000 SPECIMEN HANDLING OFFICE-LAB: CPT | Performed by: PHYSICIAN ASSISTANT

## 2018-05-17 RX ORDER — CEPHALEXIN 500 MG/1
500 CAPSULE ORAL 3 TIMES DAILY
Qty: 30 CAP | Refills: 0 | Status: SHIPPED | OUTPATIENT
Start: 2018-05-17

## 2018-05-17 RX ORDER — TRAMADOL HYDROCHLORIDE 50 MG/1
50 TABLET ORAL
Qty: 30 TAB | Refills: 0 | Status: SHIPPED
Start: 2018-05-17 | End: 2018-06-16

## 2018-05-17 RX ORDER — AMLODIPINE BESYLATE 5 MG/1
5 TABLET ORAL DAILY
COMMUNITY

## 2018-05-17 NOTE — PROGRESS NOTES
Subjective:   CC:   Chief Complaint   Patient presents with   • Knee Pain     (B)   • Foot Problem     (L) pinky toe,Blister    • Cough     Wheeze   • Medication Management     Discuss Meloxicam   • Bleeding/Bruising       Cristofer Mallory is a 47 y.o. male here today for :     New onset L small toe erythema and pain X 5 days. No trauma to the area. symtopms started w a clear fluid filled  blister that was painful, he punctured it and expressed fluid. That made the pressure and pain better. Currently area is w macerated skin and occasionally discharges fluid. Mr. Arita has to wear closed shoes sneakers. Pt is current everyday smoker cause unexplained as well as.     Patient complains of right shoulder pain which has not been controlled to read Tylenol 3. Unfortunately he has a very high co-pay and deductible and cannot go to physical therapy or pain management for or so. He was seen by Dr. Farah and got a steroid injection without much help. States he is going to get a new interest in starting January and is hoping then he can seek higher level care for his right shoulder. Patient is currently taking Xanax for anxiety and this has been the main issue prohibiting me from prescribing narcotics.      Current medicines (including changes today)  Current Outpatient Prescriptions   Medication Sig Dispense Refill   • amLODIPine (NORVASC) 5 MG Tab Take 5 mg by mouth every day.     • tramadol (ULTRAM) 50 MG Tab Take 1 Tab by mouth 1 time daily as needed for up to 30 days. 30 Tab 0   • cephALEXin (KEFLEX) 500 MG Cap Take 1 Cap by mouth 3 times a day. 30 Cap 0   • acetaminophen-codeine #3 (TYLENOL #3) 300-30 MG Tab Take 1 Tab by mouth every 12 hours as needed for up to 30 days. 60 Tab 3   • diclofenac EC (VOLTAREN) 75 MG Tablet Delayed Response Take 1 Tab by mouth 2 times a day. 60 Tab 3   • ALPRAZolam (XANAX) 0.5 MG Tab TAKE 1 TABLET BY MOUTH AT BEDTIME AS NEEDED FOR ANXIETY FOR 30 DAYS F41.9 30 Tab 2   •  "lisinopril-hydrochlorothiazide (PRINZIDE, ZESTORETIC) 20-25 MG per tablet Take 1 Tab by mouth every day. 30 Tab 1   • meloxicam (MOBIC) 15 MG tablet Take 1 Tab by mouth every day. 30 Tab 2   • carvedilol (COREG) 3.125 MG Tab TAKE 1 TAB BY MOUTH 2 TIMES A DAY, WITH MEALS. 60 Tab 1   • amoxicillin-clavulanate (AUGMENTIN) 875-125 MG Tab Take 1 Tab by mouth 2 times a day. 14 Tab 0   • nicotine (NICODERM) 21 MG/24HR PATCH 24 HR Apply  to skin as directed. 30 Patch    • cyclobenzaprine (FLEXERIL) 10 MG Tab Take 10 mg by mouth 2 times a day as needed (prn).     • lorazepam (ATIVAN) 0.5 MG Tab Take 0.5 mg by mouth every four hours as needed for Anxiety.       No current facility-administered medications for this visit.          Past medical, surgical, family, and social history are reviewed in Epic chart by me today.   Medications and allergies reviewed in Epic chart by me today.         ROS   No chest pain, no shortness of breath, no abdominal pain  As documented in history of present illness above     Objective:     Blood pressure 130/82, pulse (!) 103, temperature 36.7 °C (98.1 °F), height 1.981 m (6' 5.99\"), weight 100.6 kg (221 lb 12.5 oz), SpO2 92 %. Body mass index is 25.63 kg/m².   Physical Exam:  Constitutional: Alert, oriented in no acute distress.  Psych: Eye contact is good, speech goal directed, affect calm  Eyes: Conjunctiva non-injected, sclera non-icteric.  Skin: There is an area of macerated by this can all of their left fifth toe with purulent discharge that is not tender to palpation.   Ext: no edema, color normal, vascularity normal, temperature normal        Assessment and Plan:   The following treatment plan was discussed    1. Cellulitis of toe of left foot  Infiltrated area of the topical and oral antibiotic. Advised patient to avoid shoes that put pressure on the area if possible. Patient is to return next week to follow-up on the area.  discussed poor wound healing and smoking.   - CULTURE WOUND W/ " GRAM STAIN; Future  - cephALEXin (KEFLEX) 500 MG Cap; Take 1 Cap by mouth 3 times a day.  Dispense: 30 Cap; Refill: 0    2. Chronic right shoulder pain  Unfortunately this patient also takes Xanax for anxiety. Explained to patient that she cannot be taken benzos and narcotics together and definitely do not mix it with alcohol. Patient states he is going to stop taking Xanax and knows not to combine the 2 meidicnes. States due to his insurance he cannot afford any treatments at this point including physical therapy, referral to specialist.  Tylenol 3 has not been adequately controlling his right shoulder pain.    Also advised patient not to take diclofenac with meloxicam. Take one at the time.    - tramadol (ULTRAM) 50 MG Tab; Take 1 Tab by mouth 1 time daily as needed for up to 30 days.  Dispense: 30 Tab; Refill: 0  - CONSENT FOR OPIATE PRESCRIPTION      Followup: Return in about 1 week (around 5/24/2018).         Please note that this dictation was created using voice recognition software. I have made every reasonable attempt to correct obvious errors, but I expect that there are errors of grammar and possibly content that I did not discover before finalizing the note.

## 2018-05-18 DIAGNOSIS — L03.032 CELLULITIS OF TOE OF LEFT FOOT: ICD-10-CM

## 2018-05-19 LAB
GRAM STN SPEC: NORMAL
SIGNIFICANT IND 70042: NORMAL
SITE SITE: NORMAL
SOURCE SOURCE: NORMAL

## 2018-05-20 LAB
BACTERIA WND AEROBE CULT: NORMAL
GRAM STN SPEC: NORMAL
SIGNIFICANT IND 70042: NORMAL
SITE SITE: NORMAL
SOURCE SOURCE: NORMAL

## 2018-05-23 ENCOUNTER — HOSPITAL ENCOUNTER (OUTPATIENT)
Dept: LAB | Facility: MEDICAL CENTER | Age: 48
End: 2018-05-23
Attending: PHYSICIAN ASSISTANT
Payer: COMMERCIAL

## 2018-05-23 DIAGNOSIS — R23.1 LIVEDO RETICULARIS: ICD-10-CM

## 2018-05-23 LAB
CRP SERPL HS-MCNC: 0.24 MG/DL (ref 0–0.75)
ERYTHROCYTE [SEDIMENTATION RATE] IN BLOOD BY WESTERGREN METHOD: 21 MM/HOUR (ref 0–15)
RHEUMATOID FACT SER IA-ACNC: <10 IU/ML (ref 0–14)
URATE SERPL-MCNC: 5.9 MG/DL (ref 2.5–8.3)

## 2018-05-23 PROCEDURE — 36415 COLL VENOUS BLD VENIPUNCTURE: CPT

## 2018-05-23 PROCEDURE — 85652 RBC SED RATE AUTOMATED: CPT

## 2018-05-23 PROCEDURE — 86225 DNA ANTIBODY NATIVE: CPT

## 2018-05-23 PROCEDURE — 86140 C-REACTIVE PROTEIN: CPT

## 2018-05-23 PROCEDURE — 86431 RHEUMATOID FACTOR QUANT: CPT

## 2018-05-23 PROCEDURE — 86200 CCP ANTIBODY: CPT

## 2018-05-23 PROCEDURE — 86038 ANTINUCLEAR ANTIBODIES: CPT

## 2018-05-23 PROCEDURE — 86235 NUCLEAR ANTIGEN ANTIBODY: CPT

## 2018-05-23 PROCEDURE — 84550 ASSAY OF BLOOD/URIC ACID: CPT

## 2018-05-24 ENCOUNTER — OFFICE VISIT (OUTPATIENT)
Dept: MEDICAL GROUP | Facility: MEDICAL CENTER | Age: 48
End: 2018-05-24
Payer: COMMERCIAL

## 2018-05-24 VITALS
TEMPERATURE: 98.4 F | SYSTOLIC BLOOD PRESSURE: 132 MMHG | WEIGHT: 226.19 LBS | HEART RATE: 107 BPM | HEIGHT: 78 IN | DIASTOLIC BLOOD PRESSURE: 78 MMHG | OXYGEN SATURATION: 98 % | BODY MASS INDEX: 26.17 KG/M2

## 2018-05-24 DIAGNOSIS — L08.9: ICD-10-CM

## 2018-05-24 DIAGNOSIS — S90.425S: ICD-10-CM

## 2018-05-24 DIAGNOSIS — M54.50 ACUTE MIDLINE LOW BACK PAIN WITHOUT SCIATICA: ICD-10-CM

## 2018-05-24 PROCEDURE — 99214 OFFICE O/P EST MOD 30 MIN: CPT | Performed by: PHYSICIAN ASSISTANT

## 2018-05-24 RX ORDER — CYCLOBENZAPRINE HCL 5 MG
5-10 TABLET ORAL 3 TIMES DAILY PRN
Qty: 30 TAB | Refills: 1 | Status: SHIPPED | OUTPATIENT
Start: 2018-05-24

## 2018-05-24 NOTE — PROGRESS NOTES
"Chief Complaint   Patient presents with   • Toe Injury     (L) pinky toe   • Back Pain       HPI  Cristofer Mallory is a 47 y.o. male here today for:    Follow-up on left small toe infection. Patient has been taking Keflex. States he continues to have discomfort. He is not sure if the area is getting better or worse. Denies any discharge.    Patient complains of new onset lower back pain. States he was lifting heavy stuff couple weeks ago when he threw out his back. Patient is currently taking tramadol for pain for shoulder which he has also used for back pain. Pain is localized in the lower back and does not radiate down the legs.      Past medical, surgical, family, and social history is reviewed in Epic chart by me today.   Medications and allergies reviewed and updated in Epic chart by me today.     ROS:   As documented in history of present illness above    Exam:  Blood pressure 132/78, pulse (!) 107, temperature 36.9 °C (98.4 °F), height 1.981 m (6' 5.99\"), weight 102.6 kg (226 lb 3.1 oz), SpO2 98 %.  Constitutional: Alert, no distress, plus 3 vital signs  Skin:  Warm, dry, no rashes invisible areas  Eye: Equal, round and reactive, conjunctiva clear  MS/extremity: Patient has poor circulation, most toes joints look deformed, he wears varicose vein sucks. Hypertrophic nails  Macerated area over the left small toe appears slightly bigger however dark discoloration, blood in the center looks better. No discharge.      Cardiovascular: RRR, no murmur, poor circulation on her lower extremity has evidence by skin discoloration    Psych: Alert, pleasant, well-groomed, normal affect    A/P:    1. Infected blister of fifth toe of left foot, sequela  Marked the area with a sharpie advised patient to see if the infection in worsening or improving.    Patient is a smoker and has poor circulation which both are risk factors for poor wound healing. Discussed risk for osteomyelitis. I'm referring patient to podiatry. " Advised patient to follow-up if infection is spreading.    - REFERRAL TO PODIATRY    2. Acute midline low back pain without sciatica  - cyclobenzaprine (FLEXERIL) 5 MG tablet; Take 1-2 Tabs by mouth 3 times a day as needed.  Dispense: 30 Tab; Refill: 1    f/u prn

## 2018-05-25 LAB
CCP IGG SERPL-ACNC: 4 UNITS (ref 0–19)
NUCLEAR IGG SER QL IA: NORMAL

## 2018-05-29 ENCOUNTER — TELEPHONE (OUTPATIENT)
Dept: MEDICAL GROUP | Facility: MEDICAL CENTER | Age: 48
End: 2018-05-29

## 2018-05-29 DIAGNOSIS — I10 ESSENTIAL HYPERTENSION: ICD-10-CM

## 2018-05-29 DIAGNOSIS — R60.0 BILATERAL LOWER EXTREMITY EDEMA: ICD-10-CM

## 2018-05-29 RX ORDER — LISINOPRIL AND HYDROCHLOROTHIAZIDE 25; 20 MG/1; MG/1
1 TABLET ORAL DAILY
Qty: 30 TAB | Refills: 1 | Status: SHIPPED | OUTPATIENT
Start: 2018-05-29

## 2018-05-29 NOTE — TELEPHONE ENCOUNTER
----- Message from Abby Eubanks P.A.-C. sent at 5/29/2018  9:31 AM PDT -----  Please inform patient that labs look good for most parts without any significant abnormal findings.   Only his sed rate is elevated which is inflammatory marker. Level non-concerning at this point.     Abby Eubanks P.A.-C.

## 2018-05-29 NOTE — TELEPHONE ENCOUNTER
Was the patient seen in the last year in this department? Yes     Does patient have an active prescription for medications requested? No     Received Request Via: Pharmacy     Future Appointments       Provider Department Center    6/7/2018 4:20 PM Kvng Burns M.D. [facility]     6/8/2018 5:00 PM Usama Farah M.D. [facility]

## 2018-06-04 ENCOUNTER — TELEPHONE (OUTPATIENT)
Dept: MEDICAL GROUP | Facility: MEDICAL CENTER | Age: 48
End: 2018-06-04
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
## 2021-01-06 NOTE — TELEPHONE ENCOUNTER
Called pt and left message to call back regarding script.    PA initiated, PA approved. Auth # [id number]     Exp: 3/16/2018      Px notified, script scanned in media.   911 or go to the nearest Emergency Room

## 2022-08-31 NOTE — ED PROVIDER NOTES
Conjuntivae and eyelids appear normal,  Sclerae : White without injection ED Provider Note    Scribed for Letitia Moore D.O. by Mookie Carlos. 10/9/2017, 5:18 PM.    Primary care provider: Pcp Pt States None  Means of arrival: Walk in  History obtained from: Patient  History limited by: None    CHIEF COMPLAINT  Chief Complaint   Patient presents with   • Sent from Urgent Care   • Digit Pain     right 2nd digit 2 weeks, swelling noted with blister blow the skin.   • Peripheral Edema     apx month and a half. swelling ble with redness.    • Fatigue     HPI  Cristofer Mallory is a 47 y.o. male who presents to the Emergency Department complaining of bilateral leg swelling, onset 1.5 months. He reports that this has been intermittent in nature. Per patient his swelling improved with sleeping with elevated legs but would return after walking throughout the day. He denies associated shortness of breath. Per patient two days ago he developed a rash to his bilateral legs which has been constant and worsening. He reports associated right 2nd digit pain. Per patient his symptoms started two weeks ago and has been progressively worsening in size and has been migrating down his finger. He denies any foreign body retention, trauma, or bug bites. The patient was seen at urgent care and was told to come into the ED. Associated symptoms include fatigue. He denies associated chest pain or shortness of breath.     REVIEW OF SYSTEMS  Review of Systems   Constitutional: Positive for malaise/fatigue.   Respiratory: Negative for shortness of breath.    Cardiovascular: Positive for leg swelling. Negative for chest pain.   Genitourinary: Negative for dysuria.   Musculoskeletal: Negative for falls.        Positive finger swelling   Skin: Positive for rash.        No trauma, no bug bites, no foreign body.   Neurological: Negative for sensory change.   All other systems reviewed and are negative.  C.    PAST MEDICAL HISTORY  No pertinent past medical history     SURGICAL HISTORY   has a past surgical history that  "includes open post rx cerv vert fx,1 lvl.    SOCIAL HISTORY  Social History   Substance Use Topics   • Smoking status: Current Every Day Smoker     Packs/day: 0.50     Types: Cigarettes      Comment: 3/4 a pack   • Alcohol use Yes      Comment: 6 pack a day      History   Drug Use     Comment: pot once a week     FAMILY HISTORY  History reviewed. No pertinent family history.    CURRENT MEDICATIONS  Home Medications     Reviewed by Rayray Barahona R.N. (Registered Nurse) on 10/09/17 at 1713  Med List Status: Not Addressed   Medication Last Dose Status   hydrocodone-acetaminophen (NORCO) 5-325 MG Tab per tablet  Active   lorazepam (ATIVAN) 0.5 MG Tab 4/3/2017 Active              ALLERGIES  No Known Allergies    PHYSICAL EXAM  VITAL SIGNS: BP (!) 171/106   Pulse 93   Temp 36.9 °C (98.5 °F) (Temporal)   Resp 16   Ht 1.981 m (6' 6\")   Wt 98.6 kg (217 lb 6 oz)   SpO2 100%   BMI 25.12 kg/m²   Vitals reviewed.  Constitutional: Patient is oriented to person, place, and time. Appears well-developed and well-nourished. No distress.    Head: Normocephalic and atraumatic.   Ears: Normal external ears bilaterally.   Mouth/Throat: Oropharynx is clear and moist  Eyes: Conjunctivae are normal. Pupils are equal, round, and reactive to light.   Neck: Normal range of motion. Neck supple.  Cardiovascular: Normal rate, regular rhythm and normal heart sounds. Normal peripheral pulses.  Pulmonary/Chest: Effort normal and breath sounds normal. No respiratory distress, no wheezes, rhonchi, or rales.   Abdominal: Soft. Bowel sounds are normal. There is no tenderness, rebound or guarding, or peritoneal signs  Musculoskeletal: No tenderness. Mild symmetric BLE edema. Chronic venous stasis dermatitis to the BLE.   Neurological: No focal deficits.   Skin: Skin is warm and dry. No erythema. No pallor. Right index finger with calloused skin at the tip that appears to have old blood underlying it.   Psychiatric: Patient has a normal " mood and affect.     LABS:   Results for orders placed or performed during the hospital encounter of 10/09/17   CBC w/ Differential   Result Value Ref Range    WBC 8.5 4.8 - 10.8 K/uL    RBC 4.57 (L) 4.70 - 6.10 M/uL    Hemoglobin 15.4 14.0 - 18.0 g/dL    Hematocrit 43.9 42.0 - 52.0 %    MCV 96.1 81.4 - 97.8 fL    MCH 33.7 (H) 27.0 - 33.0 pg    MCHC 35.1 33.7 - 35.3 g/dL    RDW 43.7 35.9 - 50.0 fL    Platelet Count 360 164 - 446 K/uL    MPV 8.8 (L) 9.0 - 12.9 fL    Neutrophils-Polys 60.00 44.00 - 72.00 %    Lymphocytes 28.30 22.00 - 41.00 %    Monocytes 7.20 0.00 - 13.40 %    Eosinophils 2.80 0.00 - 6.90 %    Basophils 1.50 0.00 - 1.80 %    Immature Granulocytes 0.20 0.00 - 0.90 %    Nucleated RBC 0.00 /100 WBC    Neutrophils (Absolute) 5.07 1.82 - 7.42 K/uL    Lymphs (Absolute) 2.39 1.00 - 4.80 K/uL    Monos (Absolute) 0.61 0.00 - 0.85 K/uL    Eos (Absolute) 0.24 0.00 - 0.51 K/uL    Baso (Absolute) 0.13 (H) 0.00 - 0.12 K/uL    Immature Granulocytes (abs) 0.02 0.00 - 0.11 K/uL    NRBC (Absolute) 0.00 K/uL   Complete Metabolic Panel (CMP)   Result Value Ref Range    Sodium 133 (L) 135 - 145 mmol/L    Potassium 3.5 (L) 3.6 - 5.5 mmol/L    Chloride 103 96 - 112 mmol/L    Co2 22 20 - 33 mmol/L    Anion Gap 8.0 0.0 - 11.9    Glucose 101 (H) 65 - 99 mg/dL    Bun 19 8 - 22 mg/dL    Creatinine 0.95 0.50 - 1.40 mg/dL    Calcium 9.3 8.5 - 10.5 mg/dL    AST(SGOT) 12 12 - 45 U/L    ALT(SGPT) 8 2 - 50 U/L    Alkaline Phosphatase 73 30 - 99 U/L    Total Bilirubin 0.4 0.1 - 1.5 mg/dL    Albumin 3.8 3.2 - 4.9 g/dL    Total Protein 6.9 6.0 - 8.2 g/dL    Globulin 3.1 1.9 - 3.5 g/dL    A-G Ratio 1.2 g/dL   Btype Natriuretic Peptide   Result Value Ref Range    B Natriuretic Peptide 39 0 - 100 pg/mL   Troponin STAT   Result Value Ref Range    Troponin I <0.01 0.00 - 0.04 ng/mL   ESTIMATED GFR   Result Value Ref Range    GFR If African American >60 >60 mL/min/1.73 m 2    GFR If Non African American >60 >60 mL/min/1.73 m 2     All labs  reviewed by me.     COURSE & MEDICAL DECISION MAKING  Nursing notes, EVA, PMSFHx reviewed in chart.    Obtained and reviewed past medical records from April which indicated that he was seen in the ED for facial cellulitis.    5:18 PM - Patient seen and examined at bedside. He is overall well-appearing and nontoxic. He is not having symptoms of ACS and chest pain, dyspnea on exertion or shortness of breath. However, he has had what sounds like worsening of this bilateral lower extremities with some erythema. He is also having pain to his index finger. I discussed the treatment plan with the patient. He understands the treatment plan and verbalizes agreement.     6:39 PM Agents reevaluated., I cleaned the end of the patient's finger and with the 11 blade, removed the tip of the calloused skin. There was a small amount of pus expressed, revealing a small cavity under the calloused skin. Now that this can drain, I suspect, that he will eventually slough off the thick callus skin over his index finger. I've encouraged him to keep it clean, irrigate while bathing and encourage any draining. He do not think, he needs antibiotics for this at this time. In addition, we discussed lab results which were overall unrevealing. His potassium was slightly low but otherwise, his kidney function and liver function were normal. No evidence of congestive heart failure. Chest x-ray was normal. I suspect that this is peripheral edema, likely due to valvular insufficiency which I explained to him. At this time, I feel no further intervention is necessary and the patient can safely be discharged home.    The patient will return for new or worsening symptoms and is stable at the time of discharge.    The patient is referred to a primary physician for blood pressure management, diabetic screening, and for all other preventative health concerns.    DISPOSITION:  Patient will be discharged home in stable condition.    FOLLOW UP:  Your  Physician  Varies    In 1 week      [facility], Emergency Dept  [address] [id number] [phone]    If symptoms worsen      OUTPATIENT MEDICATIONS:  New Prescriptions    CEPHALEXIN (KEFLEX) 500 MG CAP    Take 1 Cap by mouth 4 times a day for 7 days.    SULFAMETHOXAZOLE-TRIMETHOPRIM (BACTRIM DS) 800-160 MG TABLET    Take 1 Tab by mouth 2 times a day for 7 days.     FINAL IMPRESSION  1. Finger infection    2. Peripheral edema         Mookie STOLL (Scribmelchor), am scribing for, and in the presence of, Letitia Moore D.O.    Electronically signed by: Mookie Carlos (Lukeibmelchor), 10/9/2017    ILetitia D.O. personally performed the services described in this documentation, as scribed by Mookie Carlos in my presence, and it is both accurate and complete.

## (undated) DEVICE — SUTURE 4-0 VICRYL PLUS RB-1 - 27 INCH (36/BX)

## (undated) DEVICE — DRAPE LAPAROTOMY T SHEET - (12EA/CA)

## (undated) DEVICE — LACTATED RINGERS INJ 1000 ML - (14EA/CA 60CA/PF)

## (undated) DEVICE — PEROXIDE HYDROGEN 3% 32 OZ. (12EA/BX)

## (undated) DEVICE — DETERGENT RENUZYME PLUS 10 OZ PACKET (50/BX)

## (undated) DEVICE — SPONGE GAUZESTER 4 X 4 4PLY - (128PK/CA)

## (undated) DEVICE — CANISTER SUCTION 3000ML MECHANICAL FILTER AUTO SHUTOFF MEDI-VAC NONSTERILE LF DISP  (40EA/CA)

## (undated) DEVICE — DRAPE SURGICAL U 77X120 - (10/CA)

## (undated) DEVICE — BLADE SURGICAL #15 - (50/BX 3BX/CA)

## (undated) DEVICE — BOVIE BLADE COATED - (50/PK)

## (undated) DEVICE — SENSOR SPO2 NEO LNCS ADHESIVE (20/BX) SEE USER NOTES

## (undated) DEVICE — PACK MINOR BASIN - (2EA/CA)

## (undated) DEVICE — GLOVE BIOGEL PI INDICATOR SZ 8.0 SURGICAL PF LF -(50/BX 4BX/CA)

## (undated) DEVICE — CORDS BIPOLAR COAGULATION - 12FT STERILE DISP. (10EA/BX)

## (undated) DEVICE — SUTURE GENERAL

## (undated) DEVICE — PROTECTOR ULNA NERVE - (36PR/CA)

## (undated) DEVICE — HEAD HOLDER JUNIOR/ADULT

## (undated) DEVICE — KIT ANESTHESIA W/CIRCUIT & 3/LT BAG W/FILTER (20EA/CA)

## (undated) DEVICE — SUCTION INSTRUMENT YANKAUER BULBOUS TIP W/O VENT (50EA/CA)

## (undated) DEVICE — MASK ANESTHESIA ADULT  - (100/CA)

## (undated) DEVICE — HEMOSTAT SURG ABSORBABLE - 4 X 8 IN SURGICEL (24EA/CA)

## (undated) DEVICE — NEEDLE NON SAFETY 25 GA X 1 1/2 IN HYPO (100EA/BX)

## (undated) DEVICE — STAPLER SKIN DISP - (6/BX 10BX/CA) VISISTAT

## (undated) DEVICE — BOVIE NEEDLE TIP INSULATD NON-SAFETY 2CM (50/PK)

## (undated) DEVICE — TUBING CLEARLINK DUO-VENT - C-FLO (48EA/CA)

## (undated) DEVICE — SET EXTENSION WITH 2 PORTS (48EA/CA) ***PART #2C8610 IS A SUBSTITUTE*****

## (undated) DEVICE — TRAY SRGPRP PVP IOD WT PRP - (20/CA)

## (undated) DEVICE — SLEEVE, VASO, THIGH, MED

## (undated) DEVICE — SYRINGE 10 ML CONTROL LL (25EA/BX 4BX/CA)

## (undated) DEVICE — DRAIN PENROSE STERILE 1/4 X - 18 IN  (25EA/BX)

## (undated) DEVICE — JAW BRA #93

## (undated) DEVICE — DRAPE LARGE 3 QUARTER - (20/CA)

## (undated) DEVICE — CATHETER IV 14 GA X 2 ---SURG.& SDS ONLY---(200EA/CA)

## (undated) DEVICE — NEPTUNE 4 PORT MANIFOLD - (20/PK)

## (undated) DEVICE — SYRINGE ASEPTO - (50EA/CA

## (undated) DEVICE — SODIUM CHL IRRIGATION 0.9% 1000ML (12EA/CA)

## (undated) DEVICE — DRAPE MAGNETIC (INSTRA-MAG) - (30/CA)

## (undated) DEVICE — ELECTRODE DUAL RETURN W/ CORD - (50/PK)

## (undated) DEVICE — SET LEADWIRE 5 LEAD BEDSIDE DISPOSABLE ECG (1SET OF 5/EA)

## (undated) DEVICE — TUBE E-T HI-LO CUFF 7.5MM (10EA/PK)

## (undated) DEVICE — GLOVE SZ 7.5 BIOGEL PI MICRO - PF LF (50PR/BX)

## (undated) DEVICE — ELECTRODE 850 FOAM ADHESIVE - HYDROGEL RADIOTRNSPRNT (50/PK)

## (undated) DEVICE — SUTURE 3-0 CHROMIC GUT FS-2 27 (36PK/BX)"

## (undated) DEVICE — SUTURE 2-0 ETHILON FS - (36/BX) 18 INCH

## (undated) DEVICE — CHLORAPREP 26 ML APPLICATOR - ORANGE TINT(25/CA)

## (undated) DEVICE — GLOVE BIOGEL PI ORTHO SZ 8 PF LF (40PR/BX)

## (undated) DEVICE — TUBE E-T HI-LO CUFF 7.0MM (10EA/PK)

## (undated) DEVICE — KIT ROOM DECONTAMINATION